# Patient Record
Sex: FEMALE | Race: WHITE | NOT HISPANIC OR LATINO
[De-identification: names, ages, dates, MRNs, and addresses within clinical notes are randomized per-mention and may not be internally consistent; named-entity substitution may affect disease eponyms.]

---

## 2019-02-21 PROBLEM — Z00.00 ENCOUNTER FOR PREVENTIVE HEALTH EXAMINATION: Status: ACTIVE | Noted: 2019-02-21

## 2019-02-28 ENCOUNTER — APPOINTMENT (OUTPATIENT)
Dept: SURGICAL ONCOLOGY | Facility: CLINIC | Age: 54
End: 2019-02-28
Payer: COMMERCIAL

## 2019-02-28 VITALS
SYSTOLIC BLOOD PRESSURE: 122 MMHG | HEIGHT: 62 IN | DIASTOLIC BLOOD PRESSURE: 85 MMHG | OXYGEN SATURATION: 97 % | BODY MASS INDEX: 35.88 KG/M2 | HEART RATE: 81 BPM | WEIGHT: 195 LBS

## 2019-02-28 DIAGNOSIS — R92.8 OTHER ABNORMAL AND INCONCLUSIVE FINDINGS ON DIAGNOSTIC IMAGING OF BREAST: ICD-10-CM

## 2019-02-28 PROCEDURE — 99245 OFF/OP CONSLTJ NEW/EST HI 55: CPT

## 2019-03-07 NOTE — PHYSICAL EXAM
[Normal] : supple, no neck mass and thyroid not enlarged [Normal Neck Lymph Nodes] : normal neck lymph nodes  [Normal Supraclavicular Lymph Nodes] : normal supraclavicular lymph nodes [Normal Groin Lymph Nodes] : normal groin lymph nodes [Normal Axillary Lymph Nodes] : normal axillary lymph nodes [Normal] : oriented to person, place and time, with appropriate affect [de-identified] : bilateral breast examination performed. the breasts are symmetrical. no contour/skin abnormalities. No nipple inversion or scaling. the breasts are heterogenously fibroglandular without any dominant nodule of suspicion

## 2019-03-07 NOTE — REASON FOR VISIT
[Initial Consultation] : an initial consultation for [Abnormal Mammogram] : abnormal mammogram [Family Member] : family member [FreeTextEntry2] : extensive family history of breast cancer

## 2019-03-07 NOTE — HISTORY OF PRESENT ILLNESS
[de-identified] : Ms. Campos is a 53 year old woman who presents with a report of an abnormal mammogram. She denies any new breast masses, pain, nipple inversion, nipple discharge, or skin/contour abnormalities of the breast. She denies prior breast surgeries or breast biopsies. She comes with a disc of her most recent mammogram/sonogram.\par \par Of note she reports breast cancer occurrence in her mother, her maternal aunt, and her younger sister, who just underwent bilateral mastectomies with reconstruction for newly diagnosed breast cancer.

## 2019-03-07 NOTE — ASSESSMENT
[FreeTextEntry1] : 53 year old woman with high risk family history of 3 1st/2nd degree family members with breast cancer. I suspect BRCA mutation or genetic mutation that would be identified on multi-gene panel. I recommended genetic testing and the patient and her 2 sisters reported that they are in the process of doing so.\par \par Unfortunately I was unable to upload or read the patient's discs that included reports of recent mammogram/sonogram that per reports are read as BIRADS3. Given the patient's high risk family history and BIRADS3 imaging (per report), I recommended she get a baseline breast MRI that will better delineate benign from suspicious tissue. I informed her that this may lead to the recommendation of a biopsy.\par \par I also informed her that the results of genetic testing may further suggest to her the possibility of proactive/prophylactic breast surgery to decrease her breast cancer risk.\par \par We will speak after her MRI to discuss the results and the next best steps going forward.

## 2019-03-15 ENCOUNTER — APPOINTMENT (OUTPATIENT)
Dept: MRI IMAGING | Facility: IMAGING CENTER | Age: 54
End: 2019-03-15

## 2019-03-15 ENCOUNTER — OUTPATIENT (OUTPATIENT)
Dept: OUTPATIENT SERVICES | Facility: HOSPITAL | Age: 54
LOS: 1 days | End: 2019-03-15
Payer: COMMERCIAL

## 2019-03-15 DIAGNOSIS — R92.8 OTHER ABNORMAL AND INCONCLUSIVE FINDINGS ON DIAGNOSTIC IMAGING OF BREAST: ICD-10-CM

## 2019-03-15 PROCEDURE — C8937: CPT

## 2019-03-15 PROCEDURE — 77049 MRI BREAST C-+ W/CAD BI: CPT | Mod: 26

## 2019-03-15 PROCEDURE — A9585: CPT

## 2019-03-15 PROCEDURE — C8908: CPT

## 2019-03-18 ENCOUNTER — RX CHANGE (OUTPATIENT)
Age: 54
End: 2019-03-18

## 2019-03-20 ENCOUNTER — APPOINTMENT (OUTPATIENT)
Dept: MRI IMAGING | Facility: CLINIC | Age: 54
End: 2019-03-20

## 2019-03-26 ENCOUNTER — OUTPATIENT (OUTPATIENT)
Dept: OUTPATIENT SERVICES | Facility: HOSPITAL | Age: 54
LOS: 1 days | End: 2019-03-26
Payer: COMMERCIAL

## 2019-03-26 ENCOUNTER — APPOINTMENT (OUTPATIENT)
Dept: MRI IMAGING | Facility: IMAGING CENTER | Age: 54
End: 2019-03-26
Payer: COMMERCIAL

## 2019-03-26 ENCOUNTER — RESULT REVIEW (OUTPATIENT)
Age: 54
End: 2019-03-26

## 2019-03-26 DIAGNOSIS — R92.8 OTHER ABNORMAL AND INCONCLUSIVE FINDINGS ON DIAGNOSTIC IMAGING OF BREAST: ICD-10-CM

## 2019-03-26 PROCEDURE — 19085 BX BREAST 1ST LESION MR IMAG: CPT | Mod: RT

## 2019-03-26 PROCEDURE — 19085 BX BREAST 1ST LESION MR IMAG: CPT

## 2019-03-26 PROCEDURE — 88305 TISSUE EXAM BY PATHOLOGIST: CPT

## 2019-03-26 PROCEDURE — A9585: CPT

## 2019-03-26 PROCEDURE — 77065 DX MAMMO INCL CAD UNI: CPT | Mod: 26,RT

## 2019-03-26 PROCEDURE — 88305 TISSUE EXAM BY PATHOLOGIST: CPT | Mod: 26

## 2019-03-26 PROCEDURE — 77065 DX MAMMO INCL CAD UNI: CPT

## 2019-04-11 ENCOUNTER — APPOINTMENT (OUTPATIENT)
Dept: MRI IMAGING | Facility: IMAGING CENTER | Age: 54
End: 2019-04-11
Payer: COMMERCIAL

## 2019-04-11 ENCOUNTER — OUTPATIENT (OUTPATIENT)
Dept: OUTPATIENT SERVICES | Facility: HOSPITAL | Age: 54
LOS: 1 days | End: 2019-04-11
Payer: COMMERCIAL

## 2019-04-11 ENCOUNTER — APPOINTMENT (OUTPATIENT)
Age: 54
End: 2019-04-11
Payer: COMMERCIAL

## 2019-04-11 VITALS
SYSTOLIC BLOOD PRESSURE: 112 MMHG | WEIGHT: 175 LBS | BODY MASS INDEX: 32.2 KG/M2 | DIASTOLIC BLOOD PRESSURE: 75 MMHG | HEIGHT: 62 IN | RESPIRATION RATE: 15 BRPM | HEART RATE: 75 BPM | OXYGEN SATURATION: 98 %

## 2019-04-11 DIAGNOSIS — Z00.00 ENCOUNTER FOR GENERAL ADULT MEDICAL EXAMINATION WITHOUT ABNORMAL FINDINGS: ICD-10-CM

## 2019-04-11 DIAGNOSIS — Z80.49 FAMILY HISTORY OF MALIGNANT NEOPLASM OF OTHER GENITAL ORGANS: ICD-10-CM

## 2019-04-11 DIAGNOSIS — Z78.9 OTHER SPECIFIED HEALTH STATUS: ICD-10-CM

## 2019-04-11 DIAGNOSIS — Z80.3 FAMILY HISTORY OF MALIGNANT NEOPLASM OF BREAST: ICD-10-CM

## 2019-04-11 DIAGNOSIS — Z80.41 FAMILY HISTORY OF MALIGNANT NEOPLASM OF OVARY: ICD-10-CM

## 2019-04-11 DIAGNOSIS — Z80.0 FAMILY HISTORY OF MALIGNANT NEOPLASM OF DIGESTIVE ORGANS: ICD-10-CM

## 2019-04-11 PROCEDURE — 99205 OFFICE O/P NEW HI 60 MIN: CPT

## 2019-04-11 PROCEDURE — A9585: CPT

## 2019-04-11 PROCEDURE — 74183 MRI ABD W/O CNTR FLWD CNTR: CPT | Mod: 26

## 2019-04-11 PROCEDURE — 74183 MRI ABD W/O CNTR FLWD CNTR: CPT

## 2019-04-11 NOTE — HISTORY OF PRESENT ILLNESS
[FreeTextEntry1] : 54 yo female presents for a breast reconstruction consultation.  The patient states she had an abnormal mammogram/sonogram in January 2019.  She then underwent MRI and biopsy.  The biopsy result was negative, but the patient states radiologist said biopsy was not a representative sample and the patient still had a suspicious mass that requires surgery.  \par \par The patient has a strong family history of breast cancer in her mother, sister, maternal aunts, and maternal great aunts.  She also has family history of uterine and ovarian cancer in her maternal aunt.  She states her genetic testing was negative.  The patient is overall healthy and takes no medications.  \par \par She has surgical history of abdominoplasty, three c-sections (ages 20, [age] yo), and a laparoscopic cholecystectomy.  She does not smoke, she does not drink alcohol, she is , and works as an .  She is considering bilateral mastectomy and would like to discuss reconstruction options.

## 2019-04-11 NOTE — REASON FOR VISIT
[Consultation] : a consultation visit [FreeTextEntry1] : patient presents for breast reconstruction consultation.

## 2019-04-11 NOTE — PHYSICAL EXAM
[NI] : Normal [de-identified] : sternal notch to nipple on the left is 29 cm and 29.5 cm on the right, the nipple to IMF is 13 cm on the left and 12 cm on the right, the base width is 13 cm bilaterally, there is grade 2 ptosis bilaterally. There is significant breast asymmetry.  The patient has wide breast. The left breast is lower and larger than the right breast. [de-identified] : multiple scars, transverse abdominoplasty scar; supraumbilical visceral fat present [de-identified] : limited adiposity for flap tissue transfer

## 2019-04-15 ENCOUNTER — RX RENEWAL (OUTPATIENT)
Age: 54
End: 2019-04-15

## 2019-04-22 ENCOUNTER — OUTPATIENT (OUTPATIENT)
Dept: OUTPATIENT SERVICES | Facility: HOSPITAL | Age: 54
LOS: 1 days | End: 2019-04-22

## 2019-04-22 VITALS
SYSTOLIC BLOOD PRESSURE: 120 MMHG | DIASTOLIC BLOOD PRESSURE: 82 MMHG | HEART RATE: 73 BPM | WEIGHT: 192.9 LBS | RESPIRATION RATE: 16 BRPM | OXYGEN SATURATION: 99 % | TEMPERATURE: 97 F | HEIGHT: 62.5 IN

## 2019-04-22 DIAGNOSIS — Z98.891 HISTORY OF UTERINE SCAR FROM PREVIOUS SURGERY: Chronic | ICD-10-CM

## 2019-04-22 DIAGNOSIS — Z42.1 ENCOUNTER FOR BREAST RECONSTRUCTION FOLLOWING MASTECTOMY: ICD-10-CM

## 2019-04-22 DIAGNOSIS — Z90.49 ACQUIRED ABSENCE OF OTHER SPECIFIED PARTS OF DIGESTIVE TRACT: Chronic | ICD-10-CM

## 2019-04-22 DIAGNOSIS — N63.0 UNSPECIFIED LUMP IN UNSPECIFIED BREAST: ICD-10-CM

## 2019-04-22 DIAGNOSIS — Z98.890 OTHER SPECIFIED POSTPROCEDURAL STATES: Chronic | ICD-10-CM

## 2019-04-22 LAB
ANION GAP SERPL CALC-SCNC: 13 MMO/L — SIGNIFICANT CHANGE UP (ref 7–14)
BUN SERPL-MCNC: 15 MG/DL — SIGNIFICANT CHANGE UP (ref 7–23)
CALCIUM SERPL-MCNC: 9.7 MG/DL — SIGNIFICANT CHANGE UP (ref 8.4–10.5)
CHLORIDE SERPL-SCNC: 102 MMOL/L — SIGNIFICANT CHANGE UP (ref 98–107)
CO2 SERPL-SCNC: 27 MMOL/L — SIGNIFICANT CHANGE UP (ref 22–31)
CREAT SERPL-MCNC: 0.58 MG/DL — SIGNIFICANT CHANGE UP (ref 0.5–1.3)
GLUCOSE SERPL-MCNC: 84 MG/DL — SIGNIFICANT CHANGE UP (ref 70–99)
HCT VFR BLD CALC: 42.8 % — SIGNIFICANT CHANGE UP (ref 34.5–45)
HGB BLD-MCNC: 13.6 G/DL — SIGNIFICANT CHANGE UP (ref 11.5–15.5)
MCHC RBC-ENTMCNC: 28.9 PG — SIGNIFICANT CHANGE UP (ref 27–34)
MCHC RBC-ENTMCNC: 31.8 % — LOW (ref 32–36)
MCV RBC AUTO: 90.9 FL — SIGNIFICANT CHANGE UP (ref 80–100)
NRBC # FLD: 0 K/UL — SIGNIFICANT CHANGE UP (ref 0–0)
PLATELET # BLD AUTO: 297 K/UL — SIGNIFICANT CHANGE UP (ref 150–400)
PMV BLD: 10.3 FL — SIGNIFICANT CHANGE UP (ref 7–13)
POTASSIUM SERPL-MCNC: 4.3 MMOL/L — SIGNIFICANT CHANGE UP (ref 3.5–5.3)
POTASSIUM SERPL-SCNC: 4.3 MMOL/L — SIGNIFICANT CHANGE UP (ref 3.5–5.3)
RBC # BLD: 4.71 M/UL — SIGNIFICANT CHANGE UP (ref 3.8–5.2)
RBC # FLD: 13.6 % — SIGNIFICANT CHANGE UP (ref 10.3–14.5)
SODIUM SERPL-SCNC: 142 MMOL/L — SIGNIFICANT CHANGE UP (ref 135–145)
WBC # BLD: 7.09 K/UL — SIGNIFICANT CHANGE UP (ref 3.8–10.5)
WBC # FLD AUTO: 7.09 K/UL — SIGNIFICANT CHANGE UP (ref 3.8–10.5)

## 2019-04-22 NOTE — H&P PST ADULT - ASSESSMENT
53 y.o female with preop diagnosis of encounter for breast reconstruction following mastectomy, family history of malignant neoplasm of breast

## 2019-04-22 NOTE — H&P PST ADULT - NEGATIVE GENERAL GENITOURINARY SYMPTOMS
no hematuria/no renal colic/no incontinence/no flank pain L/no dysuria/no flank pain R/no bladder infections

## 2019-04-22 NOTE — H&P PST ADULT - NSICDXPROBLEM_GEN_ALL_CORE_FT
PROBLEM DIAGNOSES  Problem: Breast lump  Assessment and Plan: pt scheduled for [facility] - Right Breast Lumpectomy Post Bernarda  Reftector Placement, Right Axillary [place] Lymph Node Biopsy Possible Dissection, Right Oncoplastic breast reconstruction, left breast reduction for symmetry on 05/03/19  Preop instructions provided. Pt verbalized understanding.   pt to take omeprazole on the morning of the surgery for GI prophylaxis  Chlorhexidine wash with instructions provided.

## 2019-04-22 NOTE — H&P PST ADULT - HISTORY OF PRESENT ILLNESS
53 y.o. female with hx of acid reflux, reports hx of abnormal mammogram in 02/2019, followed by MRI, biopsy, reports benign, pt denies changes in breast, nipple discharge, fatigue, reports breast tenderness, presents to PST for evaluation for [facility] - Right Breast Lumpectomy Post Bernarda  Reftector Placement, Right Axillary [place] Lymph Node Biopsy Possible Dissection, Ight Oncoplastic breast reconstruction, left breast reduction for symmetry on 05/03/19 53 y.o. female with hx of acid reflux, reports right breast mass noted on mammogram in 02/2019, followed by MRI, biopsy, reports benign, pt denies changes in breast, nipple discharge, fatigue, reports breast tenderness, presents to PST for evaluation for [facility] - Right Breast Lumpectomy Post Bernarda  Reftector Placement, Right Axillary [place] Lymph Node Biopsy Possible Dissection, Ight Oncoplastic breast reconstruction, left breast reduction for symmetry on 05/03/19

## 2019-04-22 NOTE — H&P PST ADULT - NEGATIVE ENMT SYMPTOMS
no sinus symptoms/no post-nasal discharge/no throat pain/no dysphagia/no nasal congestion/no hearing difficulty

## 2019-04-22 NOTE — H&P PST ADULT - NSICDXFAMILYHX_GEN_ALL_CORE_FT
FAMILY HISTORY:  Family history of breast cancer, sister, mother  Family history of rectal cancer, mother  FH: diabetes mellitus, father  FH: heart disease, father  FH: HTN (hypertension), mother, father  FH: lymphoma, sister

## 2019-04-23 ENCOUNTER — APPOINTMENT (OUTPATIENT)
Dept: MAMMOGRAPHY | Facility: IMAGING CENTER | Age: 54
End: 2019-04-23
Payer: COMMERCIAL

## 2019-04-23 ENCOUNTER — OUTPATIENT (OUTPATIENT)
Dept: OUTPATIENT SERVICES | Facility: HOSPITAL | Age: 54
LOS: 1 days | End: 2019-04-23
Payer: COMMERCIAL

## 2019-04-23 DIAGNOSIS — Z90.49 ACQUIRED ABSENCE OF OTHER SPECIFIED PARTS OF DIGESTIVE TRACT: Chronic | ICD-10-CM

## 2019-04-23 DIAGNOSIS — R92.8 OTHER ABNORMAL AND INCONCLUSIVE FINDINGS ON DIAGNOSTIC IMAGING OF BREAST: ICD-10-CM

## 2019-04-23 DIAGNOSIS — Z98.890 OTHER SPECIFIED POSTPROCEDURAL STATES: Chronic | ICD-10-CM

## 2019-04-23 DIAGNOSIS — Z98.891 HISTORY OF UTERINE SCAR FROM PREVIOUS SURGERY: Chronic | ICD-10-CM

## 2019-04-23 PROCEDURE — 19281 PERQ DEVICE BREAST 1ST IMAG: CPT | Mod: RT

## 2019-04-23 PROCEDURE — C1739: CPT

## 2019-04-23 PROCEDURE — 19281 PERQ DEVICE BREAST 1ST IMAG: CPT

## 2019-04-30 ENCOUNTER — APPOINTMENT (OUTPATIENT)
Dept: SURGICAL ONCOLOGY | Facility: CLINIC | Age: 54
End: 2019-04-30

## 2019-04-30 PROBLEM — N63.0 UNSPECIFIED LUMP IN UNSPECIFIED BREAST: Chronic | Status: ACTIVE | Noted: 2019-04-22

## 2019-04-30 PROBLEM — K21.9 GASTRO-ESOPHAGEAL REFLUX DISEASE WITHOUT ESOPHAGITIS: Chronic | Status: ACTIVE | Noted: 2019-04-22

## 2019-05-02 ENCOUNTER — TRANSCRIPTION ENCOUNTER (OUTPATIENT)
Age: 54
End: 2019-05-02

## 2019-05-03 ENCOUNTER — APPOINTMENT (OUTPATIENT)
Dept: SURGICAL ONCOLOGY | Facility: AMBULATORY SURGERY CENTER | Age: 54
End: 2019-05-03

## 2019-05-03 ENCOUNTER — OUTPATIENT (OUTPATIENT)
Dept: OUTPATIENT SERVICES | Facility: HOSPITAL | Age: 54
LOS: 1 days | End: 2019-05-03
Payer: COMMERCIAL

## 2019-05-03 ENCOUNTER — APPOINTMENT (OUTPATIENT)
Dept: NUCLEAR MEDICINE | Facility: IMAGING CENTER | Age: 54
End: 2019-05-03

## 2019-05-03 ENCOUNTER — RESULT REVIEW (OUTPATIENT)
Age: 54
End: 2019-05-03

## 2019-05-03 ENCOUNTER — APPOINTMENT (OUTPATIENT)
Dept: MAMMOGRAPHY | Facility: IMAGING CENTER | Age: 54
End: 2019-05-03

## 2019-05-03 ENCOUNTER — OUTPATIENT (OUTPATIENT)
Dept: OUTPATIENT SERVICES | Facility: HOSPITAL | Age: 54
LOS: 1 days | Discharge: HOME | End: 2019-05-03
Payer: COMMERCIAL

## 2019-05-03 VITALS
TEMPERATURE: 98 F | OXYGEN SATURATION: 99 % | WEIGHT: 192.9 LBS | HEIGHT: 62 IN | DIASTOLIC BLOOD PRESSURE: 71 MMHG | SYSTOLIC BLOOD PRESSURE: 104 MMHG | HEART RATE: 72 BPM | RESPIRATION RATE: 20 BRPM

## 2019-05-03 VITALS
HEART RATE: 91 BPM | DIASTOLIC BLOOD PRESSURE: 75 MMHG | RESPIRATION RATE: 16 BRPM | OXYGEN SATURATION: 99 % | SYSTOLIC BLOOD PRESSURE: 122 MMHG

## 2019-05-03 DIAGNOSIS — Z90.49 ACQUIRED ABSENCE OF OTHER SPECIFIED PARTS OF DIGESTIVE TRACT: Chronic | ICD-10-CM

## 2019-05-03 DIAGNOSIS — Z98.890 OTHER SPECIFIED POSTPROCEDURAL STATES: Chronic | ICD-10-CM

## 2019-05-03 DIAGNOSIS — Z42.1 ENCOUNTER FOR BREAST RECONSTRUCTION FOLLOWING MASTECTOMY: ICD-10-CM

## 2019-05-03 DIAGNOSIS — Z00.8 ENCOUNTER FOR OTHER GENERAL EXAMINATION: ICD-10-CM

## 2019-05-03 DIAGNOSIS — Z98.891 HISTORY OF UTERINE SCAR FROM PREVIOUS SURGERY: Chronic | ICD-10-CM

## 2019-05-03 PROCEDURE — 19366: CPT | Mod: RT

## 2019-05-03 PROCEDURE — 76098 X-RAY EXAM SURGICAL SPECIMEN: CPT | Mod: 26

## 2019-05-03 PROCEDURE — 19318 BREAST REDUCTION: CPT | Mod: LT

## 2019-05-03 PROCEDURE — 38792 RA TRACER ID OF SENTINL NODE: CPT | Mod: 59

## 2019-05-03 PROCEDURE — A9541: CPT

## 2019-05-03 PROCEDURE — 19125 EXCISION BREAST LESION: CPT

## 2019-05-03 PROCEDURE — 76098 X-RAY EXAM SURGICAL SPECIMEN: CPT

## 2019-05-03 PROCEDURE — 88305 TISSUE EXAM BY PATHOLOGIST: CPT | Mod: 26

## 2019-05-03 PROCEDURE — 38525 BIOPSY/REMOVAL LYMPH NODES: CPT

## 2019-05-03 PROCEDURE — 88307 TISSUE EXAM BY PATHOLOGIST: CPT | Mod: 26

## 2019-05-03 RX ORDER — SODIUM CHLORIDE 9 MG/ML
1000 INJECTION, SOLUTION INTRAVENOUS
Qty: 0 | Refills: 0 | Status: DISCONTINUED | OUTPATIENT
Start: 2019-05-03 | End: 2019-05-18

## 2019-05-03 NOTE — BRIEF OPERATIVE NOTE - NSICDXBRIEFPROCEDURE_GEN_ALL_CORE_FT
PROCEDURES:  Lumpectomy, breast, with sentinel lymph node biopsy 03-May-2019 09:16:49  Mili Mendoza  Right breast lumpectomy 03-May-2019 09:16:14  Mili Mendoza
PROCEDURES:  Reduction, breast, left 03-May-2019 11:24:11  Jg Nelson  Reconstruction, breast, using oncoplastic technique 03-May-2019 11:24:02  Jg Nelson

## 2019-05-03 NOTE — BRIEF OPERATIVE NOTE - NSICDXBRIEFPOSTOP_GEN_ALL_CORE_FT
POST-OP DIAGNOSIS:  Breast mass, right 03-May-2019 09:17:15  Mili Mendoza
POST-OP DIAGNOSIS:  Breast mass, right 03-May-2019 09:17:15  Mili Mendoza

## 2019-05-03 NOTE — BRIEF OPERATIVE NOTE - OPERATION/FINDINGS
right breast lumpectomy post KAYE , right axillary sentinel lymph node identified with isosulfan blue and technetium  Plastics to perform reconstruction/closure
left symmetrizing reduction and right oncoplastic reduction, reconstruction. 19fr neena drain in each breast. reconstruction of axillary surgical site.

## 2019-05-03 NOTE — ASU DISCHARGE PLAN (ADULT/PEDIATRIC) - ASU DC SPECIAL INSTRUCTIONSFT
Resume normal diet. Avoid straining, exercise, or heavy lifting.    Take medications as instructed by prescriptions.    Monitor and clean drains as instructed.    Sponge bathe only. Keep surgical bra and dressings dry and in place until follow up with Dr. Valentin.    Do not raise arms above head.    Follow-up with primary care doctor as well.     Call 911 and return to the ED for chest pain, shortness of breath, significant increase in pain, or significant change in color of surgical sites.

## 2019-05-03 NOTE — ASU DISCHARGE PLAN (ADULT/PEDIATRIC) - PROCEDURE
Right breast lumpectomy post KAYE , right axillary sentinel lymph node biopsy, plastics reconstruction, left breast reduction

## 2019-05-03 NOTE — ASU DISCHARGE PLAN (ADULT/PEDIATRIC) - CALL YOUR DOCTOR IF YOU HAVE ANY OF THE FOLLOWING:
Pain not relieved by Medications/Swelling that gets worse/Fever greater than (need to indicate Fahrenheit or Celsius)/Wound/Surgical Site with redness, or foul smelling discharge or pus/Bleeding that does not stop/Nausea and vomiting that does not stop/Unable to urinate/Inability to tolerate liquids or foods

## 2019-05-03 NOTE — BRIEF OPERATIVE NOTE - NSICDXBRIEFPREOP_GEN_ALL_CORE_FT
PRE-OP DIAGNOSIS:  Breast mass, right 03-May-2019 09:17:02  Mili Mendoza
PRE-OP DIAGNOSIS:  Breast mass, right 03-May-2019 09:17:02  Mili Mendoza

## 2019-05-03 NOTE — ASU DISCHARGE PLAN (ADULT/PEDIATRIC) - CARE PROVIDER_API CALL
Fidel Adames (MD)  Surgery  [address] [place] [address]  Phone: [phone]  Fax: [phone]  Follow Up Time: 2 weeks Fidel Adames)  Surgery  [address] [place] [address]  Phone: [phone]  Fax: [phone]  Follow Up Time: 2 weeks    Jg Valentin; ROBBIE)  Otolaryngology; Plastic Surgery  [address] [place] [address] [address]  [place] [address]  Phone: [phone]  Fax: [phone]  Follow Up Time: 1 week

## 2019-05-03 NOTE — ASU DISCHARGE PLAN (ADULT/PEDIATRIC) - PROVIDER TOKENS
PROVIDER:[TOKEN:[[id number]],FOLLOWUP:[2 weeks]] PROVIDER:[TOKEN:[[id number]],FOLLOWUP:[2 weeks]],PROVIDER:[TOKEN:[[id number]],FOLLOWUP:[1 week]]

## 2019-05-06 ENCOUNTER — APPOINTMENT (OUTPATIENT)
Dept: PLASTIC SURGERY | Facility: CLINIC | Age: 54
End: 2019-05-06
Payer: COMMERCIAL

## 2019-05-06 PROCEDURE — 99024 POSTOP FOLLOW-UP VISIT: CPT

## 2019-05-08 ENCOUNTER — APPOINTMENT (OUTPATIENT)
Dept: PLASTIC SURGERY | Facility: CLINIC | Age: 54
End: 2019-05-08
Payer: COMMERCIAL

## 2019-05-08 PROCEDURE — 99024 POSTOP FOLLOW-UP VISIT: CPT

## 2019-05-09 NOTE — REASON FOR VISIT
[Post Op: _________] : a [unfilled] post op visit [Family Member] : family member [FreeTextEntry1] : DOS: 05/03/2019 right onconplastic breast reconstruction left breast reduction for symmetry. Patient c/o soreness/ discomfort.

## 2019-05-13 ENCOUNTER — INPATIENT (INPATIENT)
Facility: HOSPITAL | Age: 54
LOS: 1 days | Discharge: HOME | End: 2019-05-15
Attending: PLASTIC SURGERY | Admitting: PLASTIC SURGERY
Payer: COMMERCIAL

## 2019-05-13 ENCOUNTER — RESULT REVIEW (OUTPATIENT)
Age: 54
End: 2019-05-13

## 2019-05-13 ENCOUNTER — APPOINTMENT (OUTPATIENT)
Dept: PLASTIC SURGERY | Facility: CLINIC | Age: 54
End: 2019-05-13

## 2019-05-13 VITALS
RESPIRATION RATE: 21 BRPM | OXYGEN SATURATION: 96 % | HEART RATE: 108 BPM | WEIGHT: 195.99 LBS | DIASTOLIC BLOOD PRESSURE: 65 MMHG | TEMPERATURE: 99 F | SYSTOLIC BLOOD PRESSURE: 109 MMHG | HEIGHT: 62 IN

## 2019-05-13 DIAGNOSIS — R50.9 FEVER, UNSPECIFIED: ICD-10-CM

## 2019-05-13 DIAGNOSIS — E87.0 HYPEROSMOLALITY AND HYPERNATREMIA: ICD-10-CM

## 2019-05-13 DIAGNOSIS — L03.90 CELLULITIS, UNSPECIFIED: ICD-10-CM

## 2019-05-13 DIAGNOSIS — Z90.49 ACQUIRED ABSENCE OF OTHER SPECIFIED PARTS OF DIGESTIVE TRACT: Chronic | ICD-10-CM

## 2019-05-13 DIAGNOSIS — R21 RASH AND OTHER NONSPECIFIC SKIN ERUPTION: ICD-10-CM

## 2019-05-13 DIAGNOSIS — Z98.890 OTHER SPECIFIED POSTPROCEDURAL STATES: Chronic | ICD-10-CM

## 2019-05-13 DIAGNOSIS — Z98.891 HISTORY OF UTERINE SCAR FROM PREVIOUS SURGERY: Chronic | ICD-10-CM

## 2019-05-13 LAB
ALBUMIN SERPL ELPH-MCNC: 3.1 G/DL — LOW (ref 3.3–5)
ALBUMIN SERPL ELPH-MCNC: 3.1 G/DL — LOW (ref 3.3–5)
ALP SERPL-CCNC: 51 U/L — SIGNIFICANT CHANGE UP (ref 40–120)
ALP SERPL-CCNC: 51 U/L — SIGNIFICANT CHANGE UP (ref 40–120)
ALT FLD-CCNC: 17 U/L — SIGNIFICANT CHANGE UP (ref 4–33)
ALT FLD-CCNC: 17 U/L — SIGNIFICANT CHANGE UP (ref 4–33)
ANION GAP SERPL CALC-SCNC: 18 MMO/L — HIGH (ref 7–14)
ANION GAP SERPL CALC-SCNC: 18 MMO/L — HIGH (ref 7–14)
APPEARANCE UR: CLEAR — SIGNIFICANT CHANGE UP
AST SERPL-CCNC: 17 U/L — SIGNIFICANT CHANGE UP (ref 4–32)
AST SERPL-CCNC: 17 U/L — SIGNIFICANT CHANGE UP (ref 4–32)
BASOPHILS # BLD AUTO: 0.03 K/UL — SIGNIFICANT CHANGE UP (ref 0–0.2)
BASOPHILS NFR BLD AUTO: 0.2 % — SIGNIFICANT CHANGE UP (ref 0–2)
BASOPHILS NFR SPEC: 0 % — SIGNIFICANT CHANGE UP (ref 0–2)
BASOPHILS NFR SPEC: 0 % — SIGNIFICANT CHANGE UP (ref 0–2)
BILIRUB DIRECT SERPL-MCNC: < 0.2 MG/DL — SIGNIFICANT CHANGE UP (ref 0.1–0.2)
BILIRUB SERPL-MCNC: 0.6 MG/DL — SIGNIFICANT CHANGE UP (ref 0.2–1.2)
BILIRUB SERPL-MCNC: 0.6 MG/DL — SIGNIFICANT CHANGE UP (ref 0.2–1.2)
BILIRUB UR-MCNC: NEGATIVE — SIGNIFICANT CHANGE UP
BLOOD UR QL VISUAL: NEGATIVE — SIGNIFICANT CHANGE UP
BUN SERPL-MCNC: 7 MG/DL — SIGNIFICANT CHANGE UP (ref 7–23)
BUN SERPL-MCNC: 7 MG/DL — SIGNIFICANT CHANGE UP (ref 7–23)
CALCIUM SERPL-MCNC: 9 MG/DL — SIGNIFICANT CHANGE UP (ref 8.4–10.5)
CALCIUM SERPL-MCNC: 9 MG/DL — SIGNIFICANT CHANGE UP (ref 8.4–10.5)
CHLORIDE SERPL-SCNC: 112 MMOL/L — HIGH (ref 98–107)
CHLORIDE SERPL-SCNC: 112 MMOL/L — HIGH (ref 98–107)
CO2 SERPL-SCNC: 20 MMOL/L — LOW (ref 22–31)
CO2 SERPL-SCNC: 20 MMOL/L — LOW (ref 22–31)
COLOR SPEC: SIGNIFICANT CHANGE UP
CREAT SERPL-MCNC: 0.6 MG/DL — SIGNIFICANT CHANGE UP (ref 0.5–1.3)
CREAT SERPL-MCNC: 0.6 MG/DL — SIGNIFICANT CHANGE UP (ref 0.5–1.3)
EOSINOPHIL # BLD AUTO: 0.1 K/UL — SIGNIFICANT CHANGE UP (ref 0–0.5)
EOSINOPHIL NFR BLD AUTO: 0.7 % — SIGNIFICANT CHANGE UP (ref 0–6)
EOSINOPHIL NFR FLD: 1 % — SIGNIFICANT CHANGE UP (ref 0–6)
EOSINOPHIL NFR FLD: 1 % — SIGNIFICANT CHANGE UP (ref 0–6)
GLUCOSE SERPL-MCNC: 99 MG/DL — SIGNIFICANT CHANGE UP (ref 70–99)
GLUCOSE SERPL-MCNC: 99 MG/DL — SIGNIFICANT CHANGE UP (ref 70–99)
GLUCOSE UR-MCNC: NEGATIVE — SIGNIFICANT CHANGE UP
HCT VFR BLD CALC: 38.8 % — SIGNIFICANT CHANGE UP (ref 34.5–45)
HCT VFR BLD CALC: 38.8 % — SIGNIFICANT CHANGE UP (ref 34.5–45)
HGB BLD-MCNC: 12.2 G/DL — SIGNIFICANT CHANGE UP (ref 11.5–15.5)
HGB BLD-MCNC: 12.2 G/DL — SIGNIFICANT CHANGE UP (ref 11.5–15.5)
IMM GRANULOCYTES NFR BLD AUTO: 0.9 % — SIGNIFICANT CHANGE UP (ref 0–1.5)
KETONES UR-MCNC: NEGATIVE — SIGNIFICANT CHANGE UP
LEUKOCYTE ESTERASE UR-ACNC: NEGATIVE — SIGNIFICANT CHANGE UP
LYMPHOCYTES # BLD AUTO: 14.5 % — SIGNIFICANT CHANGE UP (ref 13–44)
LYMPHOCYTES # BLD AUTO: 2.13 K/UL — SIGNIFICANT CHANGE UP (ref 1–3.3)
LYMPHOCYTES NFR SPEC AUTO: 20 % — SIGNIFICANT CHANGE UP (ref 13–44)
LYMPHOCYTES NFR SPEC AUTO: 20 % — SIGNIFICANT CHANGE UP (ref 13–44)
MACROCYTES BLD QL: SLIGHT — SIGNIFICANT CHANGE UP
MACROCYTES BLD QL: SLIGHT — SIGNIFICANT CHANGE UP
MAGNESIUM SERPL-MCNC: 1.8 MG/DL — SIGNIFICANT CHANGE UP (ref 1.6–2.6)
MANUAL SMEAR VERIFICATION: SIGNIFICANT CHANGE UP
MANUAL SMEAR VERIFICATION: SIGNIFICANT CHANGE UP
MCHC RBC-ENTMCNC: 28.4 PG — SIGNIFICANT CHANGE UP (ref 27–34)
MCHC RBC-ENTMCNC: 28.4 PG — SIGNIFICANT CHANGE UP (ref 27–34)
MCHC RBC-ENTMCNC: 31.4 % — LOW (ref 32–36)
MCHC RBC-ENTMCNC: 31.4 % — LOW (ref 32–36)
MCV RBC AUTO: 90.2 FL — SIGNIFICANT CHANGE UP (ref 80–100)
MCV RBC AUTO: 90.2 FL — SIGNIFICANT CHANGE UP (ref 80–100)
METAMYELOCYTES # FLD: 1 % — SIGNIFICANT CHANGE UP (ref 0–1)
METAMYELOCYTES # FLD: 1 % — SIGNIFICANT CHANGE UP (ref 0–1)
MONOCYTES # BLD AUTO: 0.19 K/UL — SIGNIFICANT CHANGE UP (ref 0–0.9)
MONOCYTES NFR BLD AUTO: 1.3 % — LOW (ref 2–14)
MONOCYTES NFR BLD: 1 % — LOW (ref 2–9)
MONOCYTES NFR BLD: 1 % — LOW (ref 2–9)
NEUTROPHIL AB SER-ACNC: 77 % — SIGNIFICANT CHANGE UP (ref 43–77)
NEUTROPHIL AB SER-ACNC: 77 % — SIGNIFICANT CHANGE UP (ref 43–77)
NEUTROPHILS # BLD AUTO: 12.13 K/UL — HIGH (ref 1.8–7.4)
NEUTROPHILS NFR BLD AUTO: 82.4 % — HIGH (ref 43–77)
NITRITE UR-MCNC: NEGATIVE — SIGNIFICANT CHANGE UP
NRBC # BLD: 0 /100WBC — SIGNIFICANT CHANGE UP
NRBC # BLD: 0 /100WBC — SIGNIFICANT CHANGE UP
NRBC # FLD: 0 K/UL — SIGNIFICANT CHANGE UP (ref 0–0)
NRBC # FLD: 0 K/UL — SIGNIFICANT CHANGE UP (ref 0–0)
PH UR: 7.5 — SIGNIFICANT CHANGE UP (ref 5–8)
PHOSPHATE SERPL-MCNC: 3 MG/DL — SIGNIFICANT CHANGE UP (ref 2.5–4.5)
PLATELET # BLD AUTO: 352 K/UL — SIGNIFICANT CHANGE UP (ref 150–400)
PLATELET # BLD AUTO: 352 K/UL — SIGNIFICANT CHANGE UP (ref 150–400)
PLATELET COUNT - ESTIMATE: NORMAL — SIGNIFICANT CHANGE UP
PLATELET COUNT - ESTIMATE: NORMAL — SIGNIFICANT CHANGE UP
PMV BLD: 9 FL — SIGNIFICANT CHANGE UP (ref 7–13)
PMV BLD: 9 FL — SIGNIFICANT CHANGE UP (ref 7–13)
POTASSIUM SERPL-MCNC: 4.3 MMOL/L — SIGNIFICANT CHANGE UP (ref 3.5–5.3)
POTASSIUM SERPL-MCNC: 4.3 MMOL/L — SIGNIFICANT CHANGE UP (ref 3.5–5.3)
POTASSIUM SERPL-SCNC: 4.3 MMOL/L — SIGNIFICANT CHANGE UP (ref 3.5–5.3)
POTASSIUM SERPL-SCNC: 4.3 MMOL/L — SIGNIFICANT CHANGE UP (ref 3.5–5.3)
PROT SERPL-MCNC: 6.3 G/DL — SIGNIFICANT CHANGE UP (ref 6–8.3)
PROT SERPL-MCNC: 6.3 G/DL — SIGNIFICANT CHANGE UP (ref 6–8.3)
PROT UR-MCNC: 10 — SIGNIFICANT CHANGE UP
RBC # BLD: 4.3 M/UL — SIGNIFICANT CHANGE UP (ref 3.8–5.2)
RBC # BLD: 4.3 M/UL — SIGNIFICANT CHANGE UP (ref 3.8–5.2)
RBC # FLD: 14.3 % — SIGNIFICANT CHANGE UP (ref 10.3–14.5)
RBC # FLD: 14.3 % — SIGNIFICANT CHANGE UP (ref 10.3–14.5)
SODIUM SERPL-SCNC: 150 MMOL/L — HIGH (ref 135–145)
SODIUM SERPL-SCNC: 150 MMOL/L — HIGH (ref 135–145)
SP GR SPEC: 1.01 — SIGNIFICANT CHANGE UP (ref 1–1.04)
UROBILINOGEN FLD QL: NORMAL — SIGNIFICANT CHANGE UP
WBC # BLD: 15.35 K/UL — HIGH (ref 3.8–10.5)
WBC # BLD: 15.35 K/UL — HIGH (ref 3.8–10.5)
WBC # FLD AUTO: 15.35 K/UL — HIGH (ref 3.8–10.5)
WBC # FLD AUTO: 15.35 K/UL — HIGH (ref 3.8–10.5)

## 2019-05-13 PROCEDURE — 88313 SPECIAL STAINS GROUP 2: CPT | Mod: 26

## 2019-05-13 PROCEDURE — 88312 SPECIAL STAINS GROUP 1: CPT | Mod: 26

## 2019-05-13 PROCEDURE — 99254 IP/OBS CNSLTJ NEW/EST MOD 60: CPT | Mod: GC

## 2019-05-13 PROCEDURE — 88305 TISSUE EXAM BY PATHOLOGIST: CPT | Mod: 26

## 2019-05-13 PROCEDURE — 99253 IP/OBS CNSLTJ NEW/EST LOW 45: CPT | Mod: 25

## 2019-05-13 PROCEDURE — 11104 PUNCH BX SKIN SINGLE LESION: CPT

## 2019-05-13 PROCEDURE — 99223 1ST HOSP IP/OBS HIGH 75: CPT | Mod: GC

## 2019-05-13 RX ORDER — PANTOPRAZOLE SODIUM 20 MG/1
40 TABLET, DELAYED RELEASE ORAL
Refills: 0 | Status: DISCONTINUED | OUTPATIENT
Start: 2019-05-13 | End: 2019-05-13

## 2019-05-13 RX ORDER — SODIUM CHLORIDE 9 MG/ML
1000 INJECTION INTRAMUSCULAR; INTRAVENOUS; SUBCUTANEOUS
Refills: 0 | Status: DISCONTINUED | OUTPATIENT
Start: 2019-05-13 | End: 2019-05-13

## 2019-05-13 RX ORDER — SODIUM CHLORIDE 9 MG/ML
1000 INJECTION, SOLUTION INTRAVENOUS
Refills: 0 | Status: DISCONTINUED | OUTPATIENT
Start: 2019-05-13 | End: 2019-05-15

## 2019-05-13 RX ORDER — ACETAMINOPHEN 500 MG
650 TABLET ORAL EVERY 6 HOURS
Refills: 0 | Status: DISCONTINUED | OUTPATIENT
Start: 2019-05-13 | End: 2019-05-15

## 2019-05-13 RX ORDER — SODIUM CHLORIDE 9 MG/ML
1000 INJECTION, SOLUTION INTRAVENOUS
Refills: 0 | Status: DISCONTINUED | OUTPATIENT
Start: 2019-05-13 | End: 2019-05-13

## 2019-05-13 RX ORDER — ONDANSETRON 8 MG/1
4 TABLET, FILM COATED ORAL EVERY 6 HOURS
Refills: 0 | Status: DISCONTINUED | OUTPATIENT
Start: 2019-05-13 | End: 2019-05-15

## 2019-05-13 RX ORDER — ENOXAPARIN SODIUM 100 MG/ML
40 INJECTION SUBCUTANEOUS DAILY
Refills: 0 | Status: DISCONTINUED | OUTPATIENT
Start: 2019-05-13 | End: 2019-05-15

## 2019-05-13 RX ORDER — OXYCODONE AND ACETAMINOPHEN 5; 325 MG/1; MG/1
1 TABLET ORAL EVERY 4 HOURS
Refills: 0 | Status: DISCONTINUED | OUTPATIENT
Start: 2019-05-13 | End: 2019-05-15

## 2019-05-13 RX ORDER — CALCIUM CARBONATE 500(1250)
3 TABLET ORAL EVERY 6 HOURS
Refills: 0 | Status: DISCONTINUED | OUTPATIENT
Start: 2019-05-13 | End: 2019-05-15

## 2019-05-13 RX ORDER — HYDROCORTISONE 1 %
1 OINTMENT (GRAM) TOPICAL
Refills: 0 | Status: DISCONTINUED | OUTPATIENT
Start: 2019-05-13 | End: 2019-05-15

## 2019-05-13 RX ORDER — DOCUSATE SODIUM 100 MG
100 CAPSULE ORAL THREE TIMES A DAY
Refills: 0 | Status: DISCONTINUED | OUTPATIENT
Start: 2019-05-13 | End: 2019-05-15

## 2019-05-13 RX ADMIN — Medication 1 APPLICATION(S): at 20:04

## 2019-05-13 RX ADMIN — Medication 60 MILLIGRAM(S): at 22:06

## 2019-05-13 RX ADMIN — Medication 100 MILLIGRAM(S): at 13:34

## 2019-05-13 RX ADMIN — Medication 650 MILLIGRAM(S): at 17:40

## 2019-05-13 RX ADMIN — SODIUM CHLORIDE 125 MILLILITER(S): 9 INJECTION INTRAMUSCULAR; INTRAVENOUS; SUBCUTANEOUS at 12:36

## 2019-05-13 RX ADMIN — SODIUM CHLORIDE 125 MILLILITER(S): 9 INJECTION, SOLUTION INTRAVENOUS at 20:04

## 2019-05-13 RX ADMIN — ENOXAPARIN SODIUM 40 MILLIGRAM(S): 100 INJECTION SUBCUTANEOUS at 22:07

## 2019-05-13 RX ADMIN — SODIUM CHLORIDE 125 MILLILITER(S): 9 INJECTION, SOLUTION INTRAVENOUS at 16:26

## 2019-05-13 RX ADMIN — Medication 650 MILLIGRAM(S): at 16:25

## 2019-05-13 RX ADMIN — Medication 100 MILLIGRAM(S): at 22:07

## 2019-05-13 RX ADMIN — SODIUM CHLORIDE 125 MILLILITER(S): 9 INJECTION INTRAMUSCULAR; INTRAVENOUS; SUBCUTANEOUS at 13:34

## 2019-05-13 NOTE — CONSULT NOTE ADULT - SUBJECTIVE AND OBJECTIVE BOX
HPI: 53 F hx GERD, transferred from OSH at pt request. On 5/3       53 y.o. female with hx of acid reflux, reports right breast mass noted on mammogram in 2019, followed by MRI, biopsy, reports benign, pt denies changes in breast, nipple discharge, fatigue, reports breast tenderness, presents to PST for evaluation for [facility] - Right Breast Lumpectomy Post Bernarda  Reftector Placement, Right Axillary [place] Lymph Node Biopsy Possible Dissection, rIght Oncoplastic breast reconstruction, left breast reduction for symmetry on 19. On 5/10 she had fever to 101 and developed a diffuse rash on her face, legs, arms with associated swelling. She subsequently went to [facility] ED and was transferred here. (13 May 2019 12:40)      PAST MEDICAL & SURGICAL HISTORY:  Breast lump in female  Acid reflux  S/P cholecystectomy:   H/O abdominoplasty:   H/O: : , , 2006      Allergies    No Known Allergies    Intolerances        ANTIMICROBIALS:      OTHER MEDS:  acetaminophen   Tablet .. 650 milliGRAM(s) Oral every 6 hours PRN  aluminum hydroxide/magnesium hydroxide/simethicone Suspension 30 milliLiter(s) Oral every 4 hours PRN  calcium carbonate    500 mG (Tums) Chewable 3 Tablet(s) Chew every 6 hours PRN  docusate sodium 100 milliGRAM(s) Oral three times a day  enoxaparin Injectable 40 milliGRAM(s) SubCutaneous daily  lactated ringers. 1000 milliLiter(s) IV Continuous <Continuous>  ondansetron Injectable 4 milliGRAM(s) IV Push every 6 hours PRN  oxyCODONE    5 mG/acetaminophen 325 mG 1 Tablet(s) Oral every 4 hours PRN      SOCIAL HISTORY:    Marital Status:    Occupation:   Lives with:     Substance Use (street drugs):   Tobacco Usage:    Alcohol Usage: Social EtOH    FAMILY HISTORY:  FH: heart disease: father  FH: diabetes mellitus: father  Family history of rectal cancer: mother  FH: lymphoma: sister  Family history of breast cancer: sister, mother  FH: HTN (hypertension): mother, father      ROS:  Unobtainable because:   All other systems negative     Constitutional: no fever, no chills, no weight loss, no night sweats  Eye: no eye pain, no redness, no vision changes  ENT:  no sore throat, no rhinorrhea  Cardiovascular:  no chest pain, no palpitation  Respiratory:  no SOB, no cough  GI:  no abd pain, no vomiting, no diarrhea  urinary: no dysuria, no hematuria, no flank pain  : no  discharge or bleeding  musculoskeletal:  no joint pain, no joint swelling  skin:  no rash  neurology:  no headache, no seizure, no change in mental status  psych: no anxiety, no depression     Physical Exam:    General:    NAD, non toxic  Head: atraumatic, normocephalic  Eyes: normal sclera and conjunctiva  ENT:   no oropharyngeal lesions, no LAD, neck supple  Cardio:    regular S1,S2, no murmur  Respiratory:   clear b/l, no wheezing  abd:   soft, BS +, not tender, no hepatosplenomegaly  :     no CVAT, no suprapubic tenderness, no hickman  Musculoskeletal : no joint swelling, no edema  Skin:    no rash  vascular: no lines, normal pulses  Neurologic:     no focal deficits  psych: normal affect, no suicidal ideation      Drug Dosing Weight  Height (cm): 157.48 (13 May 2019 11:00)  Weight (kg): 88.66527714491084 (13 May 2019 11:00)  BMI (kg/m2): 35.8 (13 May 2019 11:00)  BSA (m2): 1.9 (13 May 2019 11:00)    Vital Signs Last 24 Hrs  T(F): 99.1 (19 @ 11:00), Max: 99.1 (19 @ 11:00)    Vital Signs Last 24 Hrs  HR: 108 (19 @ 11:00) (108 - 108)  BP: 109/65 (19 @ 11:00) (109/65 - 109/65)  RR: 21 (19 @ 11:00)  SpO2: 96% (19 @ 11:00) (96% - 96%)  Wt(kg): --                          12.2   15.35 )-----------( 352      ( 13 May 2019 12:01 )             38.8           150<H>  |  112<H>  |  7   ----------------------------<  99  4.3   |  20<L>  |  0.60    Ca    9.0      13 May 2019 12:01  Phos  3.0     05-13  Mg     1.8         TPro  6.3  /  Alb  3.1<L>  /  TBili  0.6  /  DBili  < 0.2  /  AST  17  /  ALT  17  /  AlkPhos  51            MICROBIOLOGY:  v              RADIOLOGY: HPI: 53 F hx GERD, transferred from OSH at pt request. On 5/3 had R breast lumpectomy and R LN bx w pathology showing R breast LCIS and nl LN. Also had left breast reduction on 5/3. Received ancef periop. D/c on oxycontin and no abx per pt. Was taking pain meds sat through tues and then again on thurs. Started noticing rash on friday, started on back of thighs and palms and proceeded to involve legs and then trunk and face. Has had MMR vaccine. Tried taking benadryl w no improvement. Also w fever 101F on saturday, took tylenol. No new detergents, foods, soaps, lotions. Some itching in hands/feet. No eye injection, oral ulcers/peeling, dysuria. On  fainted for about three minutes. EMS arrived and pts BP was low SBP 80s. Lives in [place] so went to OSH ED . Tmax at osh 100.6F. Had ct chest showing collection 6 x 1.7cm in R breast, likely post-op tho per report early abscess cannot be excluded. Given unasyn and vanco in ED and then vanco, clinda on floors before transfer to [facility] today.     Surgical sites w no discharge, malodor, worsening pain.       PAST MEDICAL & SURGICAL HISTORY:  Breast lump in female  Acid reflux  S/P cholecystectomy:   H/O abdominoplasty:   H/O: : , ,       Allergies    No Known Allergies    Intolerances        ANTIMICROBIALS:      OTHER MEDS:  acetaminophen   Tablet .. 650 milliGRAM(s) Oral every 6 hours PRN  aluminum hydroxide/magnesium hydroxide/simethicone Suspension 30 milliLiter(s) Oral every 4 hours PRN  calcium carbonate    500 mG (Tums) Chewable 3 Tablet(s) Chew every 6 hours PRN  docusate sodium 100 milliGRAM(s) Oral three times a day  enoxaparin Injectable 40 milliGRAM(s) SubCutaneous daily  lactated ringers. 1000 milliLiter(s) IV Continuous <Continuous>  ondansetron Injectable 4 milliGRAM(s) IV Push every 6 hours PRN  oxyCODONE    5 mG/acetaminophen 325 mG 1 Tablet(s) Oral every 4 hours PRN      SOCIAL HISTORY:  Lives with: family    Substance Use (street drugs): denies  Tobacco Usage:  denies  Alcohol Usage: denies    FAMILY HISTORY:  FH: heart disease: father  FH: diabetes mellitus: father  Family history of rectal cancer: mother  FH: lymphoma: sister  Family history of breast cancer: sister, mother  FH: HTN (hypertension): mother, father      ROS:   All other systems negative     Constitutional: fever, no chills  Eye: no eye pain, no redness  ENT:  no sore throat, no rhinorrhea  Cardiovascular:  no chest pain  Respiratory:  no SOB, no cough  GI:  no abd pain, no vomiting, no diarrhea  urinary: no dysuria  skin:   rash  neurology:  no headache    Physical Exam:    General:    NAD, non toxic  Head: atraumatic, normocephalic  Eyes: normal sclera and conjunctiva  ENT:   no oropharyngeal lesions, neck supple  Cardio:    regular S1,S2  Respiratory:   clear b/l, no wheezing  abd:   soft, BS +, not tender, no hepatosplenomegaly  Musculoskeletal : no joint swelling, some swelling of b/l hands  Skin:  maculopapular rash involving full body  Neurologic:     no focal deficits  psych: normal affect      Drug Dosing Weight  Height (cm): 157.48 (13 May 2019 11:00)  Weight (kg): 88.15508542119195 (13 May 2019 11:00)  BMI (kg/m2): 35.8 (13 May 2019 11:00)  BSA (m2): 1.9 (13 May 2019 11:00)    Vital Signs Last 24 Hrs  T(F): 99.1 (19 @ 11:00), Max: 99.1 (19 @ 11:00)    Vital Signs Last 24 Hrs  HR: 108 (19 @ 11:00) (108 - 108)  BP: 109/65 (19 @ 11:00) (109/65 - 109/65)  RR: 21 (19 @ 11:00)  SpO2: 96% (19 @ 11:00) (96% - 96%)  Wt(kg): --                          12.2   15.35 )-----------( 352      ( 13 May 2019 12:01 )             38.8       05-13    150<H>  |  112<H>  |  7   ----------------------------<  99  4.3   |  20<L>  |  0.60    Ca    9.0      13 May 2019 12:01  Phos  3.0     05-  Mg     1.8     05-    TPro  6.3  /  Alb  3.1<L>  /  TBili  0.6  /  DBili  < 0.2  /  AST  17  /  ALT  17  /  AlkPhos  51  05-          MICROBIOLOGY:      RADIOLOGY:

## 2019-05-13 NOTE — CONSULT NOTE ADULT - PROBLEM SELECTOR RECOMMENDATION 3
with hyperchloremia  - likely due to 0.9% saline infusion  - would change IVF to LR with hyperchloremia  - likely due to 0.9% saline infusion  - would change IVF to 0.45% NS with hyperchloremia  - likely due to 0.9% saline infusion  - change IVF to 0.45% NS

## 2019-05-13 NOTE — CONSULT NOTE ADULT - ASSESSMENT
52 yo F w/PMH of w/PMH of PILLO (2018), recently underwent right-sided lumpectomy and left-sided breast reduction surgery (5/3) who presents as a transfer from [facility] in [place] for fevers and diffuse rash

## 2019-05-13 NOTE — CONSULT NOTE ADULT - SUBJECTIVE AND OBJECTIVE BOX
Dermatology is consulted for rash and fever    HPI:  53 y.o. female with hx of GERD and lumpectomy admitted for fevers and rash. Patient had breast surgery on 5/3 and was discharged home. She notes that on 51/10 she started developing rash and fever. Notes pink swollen patches on the hands. Notes associated malaise. No respiratory symptoms. No sick contacts. Ancef was given to patient intraoperatively for her breast surgery about 10 days ago. Omeprazole has been taken for the past 1 month. No other antibiotics medications or OTC medications prior to the development of this rash. Has never had a rash like this before. Patient notes she lives in [place] and has dogs and owns a large plot of land, she is concerned about Lyme disease.  She does not recall any tick bites. No recent travel.         PAST MEDICAL & SURGICAL HISTORY:  Breast lump in female  Acid reflux  S/P cholecystectomy:   H/O abdominoplasty:   H/O: : , ,       REVIEW OF SYSTEMS    General: no fevers/chills, no lethargy	    Skin/Breast: see HPI  	  Ophthalmologic: no eye pain or change in vision  	  ENMT: no dysphagia or change in hearing    Respiratory and Thorax: no SOB or cough  	  Cardiovascular: no palpitations or chest pain    Gastrointestinal: no abdominal pain or blood in stool     Genitourinary: no dysuria or frequency    Musculoskeletal: no joint pains    Neurological: no weakness, numbness , or tingling    MEDICATIONS  (STANDING):  docusate sodium 100 milliGRAM(s) Oral three times a day  enoxaparin Injectable 40 milliGRAM(s) SubCutaneous daily  lactated ringers. 1000 milliLiter(s) (125 mL/Hr) IV Continuous <Continuous>    MEDICATIONS  (PRN):  acetaminophen   Tablet .. 650 milliGRAM(s) Oral every 6 hours PRN Temp greater or equal to 38C (100.4F), Mild Pain (1 - 3)  aluminum hydroxide/magnesium hydroxide/simethicone Suspension 30 milliLiter(s) Oral every 4 hours PRN Dyspepsia  calcium carbonate    500 mG (Tums) Chewable 3 Tablet(s) Chew every 6 hours PRN Dyspepsia  ondansetron Injectable 4 milliGRAM(s) IV Push every 6 hours PRN Nausea  oxyCODONE    5 mG/acetaminophen 325 mG 1 Tablet(s) Oral every 4 hours PRN Moderate Pain (4 - 6)      Allergies    No Known Allergies    Intolerances        SOCIAL HISTORY:    FAMILY HISTORY:  FH: heart disease: father  FH: diabetes mellitus: father  Family history of rectal cancer: mother  FH: lymphoma: sister  Family history of breast cancer: sister, mother  FH: HTN (hypertension): mother, father      Vital Signs Last 24 Hrs  T(C): 37.3 (13 May 2019 11:00), Max: 37.3 (13 May 2019 11:00)  T(F): 99.1 (13 May 2019 11:00), Max: 99.1 (13 May 2019 11:00)  HR: 108 (13 May 2019 11:) (108 - 108)  BP: 109/65 (13 May 2019 11:00) (109/65 - 109/65)  BP(mean): --  RR: 21 (13 May 2019 11:) (21 - 21)  SpO2: 96% (13 May 2019 11:) (96% - 96%)    PHYSICAL EXAM:     The patient was alert and oriented X 3, well nourished, and in no  apparent distress.  OP showed no ulcerations  There was no visible lymphadenopathy.  Conjunctiva were non injected  There was edema of the bilateral hands and feet as well as the ears   The scalp, hair, face, eyebrows, lips, OP, neck, chest, back,   extremities X 4, nails were examined.  There was no hyperhidrosis or bromhidrosis.    Of note on skin exam:   the bilateral arms, thighs, abdomen, back, legs, neck, and face with pink papules coalescing into plaques. Blanching erythema.     LABS:                        12.2   15.35 )-----------( 352      ( 13 May 2019 12:01 )             38.8     05-13    150<H>  |  112<H>  |  7   ----------------------------<  99  4.3   |  20<L>  |  0.60    Ca    9.0      13 May 2019 12:01  Phos  3.0     05-13  Mg     1.8     05-13    TPro  6.3  /  Alb  3.1<L>  /  TBili  0.6  /  DBili  < 0.2  /  AST  17  /  ALT  17  /  AlkPhos  51  05-13          RADIOLOGY & ADDITIONAL STUDIES:

## 2019-05-13 NOTE — CONSULT NOTE ADULT - ATTENDING COMMENTS
Melany Trejo MD  Pager: [phone]  After 5 PM or weekends please call fellow on call or office [phone]

## 2019-05-13 NOTE — H&P ADULT - NSHPPHYSICALEXAM_GEN_ALL_CORE
Gen: well appearing female, NAD  Breast: bilateral breasts soft, no palpable collections. Overlying skin without erythema, incision CDI,   Skin: Maculopapular rash on face, arms, legs, stomach, blanches, no discharge

## 2019-05-13 NOTE — H&P ADULT - ATTENDING COMMENTS
I saw the patient on May 13 at 18:30 at [facility]  She complains of three days of pigmented skin changes, fevers, and fatigue. She had a vasovagal episode at home.  She denies cp, sob, calf pain.  The patient has no breast complaints.     The breasts are soft.  The incisions are intact.  There is no fluctuance.  There is no erythema of the breasts.  There is no tenderness to palpation.     The patient and I had detailed discussion about potential etiologies.  Based on consultations by medicine, dermatology, and infectious disease, the leading diagnosis is a drug reaction.  The recommendations will be followed.

## 2019-05-13 NOTE — CONSULT NOTE ADULT - PROBLEM SELECTOR RECOMMENDATION 2
Patient with diffuse maculopapular rash most prominent on face, arms and legs  - differential includes drug reaction vs viral vs Sweet syndrome  - can continue PRN Benadryl for symptom-management  - consider dermatology consult Patient with diffuse maculopapular rash most prominent on face, arms and legs  - differential as above  - can continue PRN Benadryl for symptom-management  - recommend dermatology and ID consults

## 2019-05-13 NOTE — H&P ADULT - NSHPLABSRESULTS_GEN_ALL_CORE
CBC (05-13 @ 12:01)                              12.2                           15.35<H>  )----------------(  352        82.4<H>% Neutrophils, 14.5  % Lymphocytes, ANC: 12.13<H>                              38.8                  BMP (05-13 @ 12:01)             150<H>  |  112<H>  |  7     		Ca++ --      Ca 9.0                ---------------------------------( 99    		Mg 1.8                4.3     |  20<L>   |  0.60  			Ph 3.0       LFTs (05-13 @ 12:01)      TPro 6.3 / Alb 3.1<L> / TBili 0.6 / DBili < 0.2 / AST 17 / ALT 17 / AlkPhos 51

## 2019-05-13 NOTE — CONSULT NOTE ADULT - PROBLEM SELECTOR RECOMMENDATION 9
Patient with reported fevers to 101, afebrile here  - recommend full infectious work up including blood cultures, UA, urine culture, RVP and CXR  - can continue broad spectrum antibiotics in the interim  - may attempt to reach out to OSH for results of blood cultures taken there Patient with reported fevers to 101, afebrile here  - recommend full infectious work up including blood cultures, UA, urine culture, RVP and CXR  - can continue broad spectrum antibiotics in the interim  - may attempt to reach out to OSH for results of blood cultures taken there  - check HIV and measles titers Patient with reported fevers to 101, afebrile here. DDx is broad and includes viral exanthem, tickborne illness, potential bacterial infection, rheumatologic process.  - recommend full infectious work up including blood cultures, UA, urine culture, RVP and CXR  - recommend dermatology and ID consults; can initiate amp-sulbactam for empiric coverage of non-MDR organisms given patient's overall hemodynamic stability  - reach out tomorrow (48 hour shima) to OSH for results of blood cultures taken there  - check HIV and measles titers (IgG, IgM); lower suspicion for measles given reported history of immunization, but must exclude this as a possibility Patient with reported fevers to 101, afebrile here. DDx is broad and includes viral exanthem, tickborne illness, potential bacterial infection, rheumatologic process.  - recommend full infectious work up including blood cultures, UA, urine culture, RVP and CXR  - recommend dermatology and ID consults; will defer broad spectrum abx to ID in light of patient's overall hemodynamic stability  - reach out tomorrow (48 hour shima) to OSH for results of blood cultures taken there  - check HIV and measles titers (IgG, IgM); lower suspicion for measles given reported history of immunization, but must exclude this as a possibility

## 2019-05-13 NOTE — H&P ADULT - ASSESSMENT
53y f with diffuse rash/fevers 7 days post op from oncoplastic reduction and symmetrizing reduction. Breasts do not appear to be involved at this time given lack of significant exam findings on the breast  - admit  - dermatology consult  - medicine/ID consult  - will follow

## 2019-05-13 NOTE — CONSULT NOTE ADULT - ASSESSMENT
**INCOMPLETE***      1. Morbilliform drug eruption. Involving the trunk bilateral arms, legs, face. Does not meet criteria for DRESS. Normal LFTs, normal eosinophil count, normal creatinine. Offending agent for this eruption is likely Ancef vs Omeprazole. No other drug exposures prior to eruption per patient report.     -Please continue to monitor CBC CMP and fever curve   -Counseled patient on the nature and course of this condition. Will take 4-6 weeks to resolve and once improving will result in superficial peeling of the skin.   -Triamcinolone ointment 0.1% BID to the AA for the body. Please provide multiple tubes of ointment as they are dispensed only in small tubes which will not cover the surface area affected.   -Hydrocortisone ointment 2.5% BID for the affected areas on the face   -Please notify dermatology immediately if there is any oral, ocular, genital involvement or blistering   -Patient has declined biopsy today and will reconsider tomorrow       Patient to follow up at [facility] [address] [phone] when ready for discharge    Nohemi Monsivais MD   Dermatology Resident PGY-2   [phone] 1. Serum Sickness like reaction- given arthralgias, swelling, and urticarial papules and plaques- Morbilliform drug eruption also on the ddx. Involving the trunk bilateral arms, legs, face. Does not meet criteria for DRESS. Normal LFTs, normal eosinophil count, normal creatinine. Offending agent for this eruption is likely Ancef. No other drug exposures prior to eruption per patient report.     -Please continue to monitor CBC CMP and fever curve   -Start prednisone 60mg for 3 days, 40mg for 3 days, 20mg for 3 days, 10mg for 3 days then OFF  -Counseled patient on the nature and course of this condition. Will take 4-6 weeks to resolve and once improving will result in superficial peeling of the skin.   -Triamcinolone ointment 0.1% BID to the AA for the body. Please provide multiple tubes of ointment as they are dispensed only in small tubes which will not cover the surface area affected.   -Hydrocortisone ointment 2.5% BID for the affected areas on the face   -Antihistamines around the clock. Zyrtec 10mg BID   -Please notify dermatology immediately if there is any oral, ocular, genital involvement or blistering   -Punch biopsy done today- results take 5 business days     Dermatology Punch Biopsy Procedure Note    After risks and benefits of procedure including bleeding, infection and scar were reviewed (consents including photo consent reviewed, signed and in chart), allergies were reviewed and time out performed.       Area cleaned with rubbing alcohol and anesthetized with lidocaine and epinephrine.  A 4mm punch biopsy was performed to right thigh, hemostasis achieved with 4-0 Chromic gut sutures. Dressing with Vaseline. Wound care reviewed with patient and team.        Sutures are dissolvable, no need for removal. Please leave dressing on for 24-48 hours and after that please apply vaseline on the biopsy site 2-3 times daily to keep area moist and promote healing.     Nohemi Monsivais MD PGY-2  Dermatology Resident   Office: [phone]

## 2019-05-13 NOTE — H&P ADULT - HISTORY OF PRESENT ILLNESS
53 y.o. female with hx of acid reflux, reports right breast mass noted on mammogram in 02/2019, followed by MRI, biopsy, reports benign, pt denies changes in breast, nipple discharge, fatigue, reports breast tenderness, presents to PST for evaluation for [place] - Right Breast Lumpectomy Post Bernarda  Reftector Placement, Right Axillary [place] Lymph Node Biopsy Possible Dissection, Ight Oncoplastic breast reconstruction, left breast reduction for symmetry on 05/03/19. 53 y.o. female with hx of acid reflux, reports right breast mass noted on mammogram in 02/2019, followed by MRI, biopsy, reports benign, pt denies changes in breast, nipple discharge, fatigue, reports breast tenderness, presents to PST for evaluation for [place] - Right Breast Lumpectomy Post Bernarda  Reftector Placement, Right Axillary [place] Lymph Node Biopsy Possible Dissection, rIght Oncoplastic breast reconstruction, left breast reduction for symmetry on 05/03/19. On 5/10 she had fever to 101 and developed a diffuse rash on her face, legs, arms with associated swelling. She subsequently went to [facility] ED and was transferred here.

## 2019-05-14 LAB
ANION GAP SERPL CALC-SCNC: 12 MMO/L — SIGNIFICANT CHANGE UP (ref 7–14)
BUN SERPL-MCNC: 7 MG/DL — SIGNIFICANT CHANGE UP (ref 7–23)
CALCIUM SERPL-MCNC: 9 MG/DL — SIGNIFICANT CHANGE UP (ref 8.4–10.5)
CHLORIDE SERPL-SCNC: 107 MMOL/L — SIGNIFICANT CHANGE UP (ref 98–107)
CO2 SERPL-SCNC: 24 MMOL/L — SIGNIFICANT CHANGE UP (ref 22–31)
CREAT SERPL-MCNC: 0.58 MG/DL — SIGNIFICANT CHANGE UP (ref 0.5–1.3)
GLUCOSE SERPL-MCNC: 177 MG/DL — HIGH (ref 70–99)
HCT VFR BLD CALC: 33.4 % — LOW (ref 34.5–45)
HGB BLD-MCNC: 10.7 G/DL — LOW (ref 11.5–15.5)
MAGNESIUM SERPL-MCNC: 1.8 MG/DL — SIGNIFICANT CHANGE UP (ref 1.6–2.6)
MCHC RBC-ENTMCNC: 28.5 PG — SIGNIFICANT CHANGE UP (ref 27–34)
MCHC RBC-ENTMCNC: 32 % — SIGNIFICANT CHANGE UP (ref 32–36)
MCV RBC AUTO: 89.1 FL — SIGNIFICANT CHANGE UP (ref 80–100)
NRBC # FLD: 0 K/UL — SIGNIFICANT CHANGE UP (ref 0–0)
PHOSPHATE SERPL-MCNC: 3.1 MG/DL — SIGNIFICANT CHANGE UP (ref 2.5–4.5)
PLATELET # BLD AUTO: 318 K/UL — SIGNIFICANT CHANGE UP (ref 150–400)
PMV BLD: 9.4 FL — SIGNIFICANT CHANGE UP (ref 7–13)
POTASSIUM SERPL-MCNC: 3.9 MMOL/L — SIGNIFICANT CHANGE UP (ref 3.5–5.3)
POTASSIUM SERPL-SCNC: 3.9 MMOL/L — SIGNIFICANT CHANGE UP (ref 3.5–5.3)
RBC # BLD: 3.75 M/UL — LOW (ref 3.8–5.2)
RBC # FLD: 14.2 % — SIGNIFICANT CHANGE UP (ref 10.3–14.5)
SODIUM SERPL-SCNC: 143 MMOL/L — SIGNIFICANT CHANGE UP (ref 135–145)
SPECIMEN SOURCE: SIGNIFICANT CHANGE UP
SPECIMEN SOURCE: SIGNIFICANT CHANGE UP
WBC # BLD: 15.74 K/UL — HIGH (ref 3.8–10.5)
WBC # FLD AUTO: 15.74 K/UL — HIGH (ref 3.8–10.5)

## 2019-05-14 PROCEDURE — 99232 SBSQ HOSP IP/OBS MODERATE 35: CPT | Mod: GC

## 2019-05-14 PROCEDURE — 93010 ELECTROCARDIOGRAM REPORT: CPT

## 2019-05-14 RX ORDER — HYDROXYZINE HCL 10 MG
25 TABLET ORAL AT BEDTIME
Refills: 0 | Status: DISCONTINUED | OUTPATIENT
Start: 2019-05-14 | End: 2019-05-15

## 2019-05-14 RX ORDER — LORATADINE 10 MG/1
10 TABLET ORAL
Refills: 0 | Status: DISCONTINUED | OUTPATIENT
Start: 2019-05-14 | End: 2019-05-15

## 2019-05-14 RX ORDER — CETIRIZINE HYDROCHLORIDE 10 MG/1
10 TABLET ORAL
Refills: 0 | Status: DISCONTINUED | OUTPATIENT
Start: 2019-05-14 | End: 2019-05-15

## 2019-05-14 RX ADMIN — ENOXAPARIN SODIUM 40 MILLIGRAM(S): 100 INJECTION SUBCUTANEOUS at 11:24

## 2019-05-14 RX ADMIN — Medication 100 MILLIGRAM(S): at 22:34

## 2019-05-14 RX ADMIN — Medication 1 APPLICATION(S): at 09:18

## 2019-05-14 RX ADMIN — SODIUM CHLORIDE 125 MILLILITER(S): 9 INJECTION, SOLUTION INTRAVENOUS at 04:17

## 2019-05-14 RX ADMIN — Medication 1 APPLICATION(S): at 09:19

## 2019-05-14 RX ADMIN — Medication 100 MILLIGRAM(S): at 13:13

## 2019-05-14 RX ADMIN — Medication 25 MILLIGRAM(S): at 22:34

## 2019-05-14 RX ADMIN — Medication 60 MILLIGRAM(S): at 05:28

## 2019-05-14 RX ADMIN — Medication 1 APPLICATION(S): at 22:33

## 2019-05-14 RX ADMIN — Medication 1 APPLICATION(S): at 22:34

## 2019-05-14 RX ADMIN — Medication 100 MILLIGRAM(S): at 05:29

## 2019-05-14 RX ADMIN — SODIUM CHLORIDE 125 MILLILITER(S): 9 INJECTION, SOLUTION INTRAVENOUS at 07:45

## 2019-05-14 NOTE — PROGRESS NOTE ADULT - ATTENDING COMMENTS
Melany Trejo MD  Pager: [phone]  After 5 PM or weekends please call fellow on call or office [phone]

## 2019-05-14 NOTE — CHART NOTE - NSCHARTNOTEFT_GEN_A_CORE
Was paged by RN w/ concern of pt reported chest pain. Patient was immediately seen. On arrival to floor patient was ambulating with sister and in no acute distress. Vital signs all within normal limits. Patient stated she 'wasn't sure if it was chest pain, the rash, my breast surgery, or anxiety' and that the pain was no longer present. Patient reported no SOB and on exam had nonlabored respirations w/o retractions. Rash on chest unchanged. Pain not reproducible. Patient had difficulty describing pain, but did not radiate. Lower extremities w/o new edema, no size discrepency btw b/l legs, no pain with plantar flexion. EKG was obtained that showed right bundle branch block. No prior EKG's in system for comparison so Pt's PCP was contacted who confirmed this is NOT a new finding, but was diagnosed previously and appropriate work up was done several years ago. Cardiology was consulted and agree in the setting of resolved possible CP and stable vital signs no further cardiac workup needed. Will continue to monitor and will reevaluate if new symptoms present.

## 2019-05-14 NOTE — CHART NOTE - NSCHARTNOTEFT_GEN_A_CORE
Dermatology Brief Note    Overnight/Interim:  Patient notes she feels much better, swelling improved. Does note that swelling in the foot comes and goes   Rash is much better, her pain is improved   She has not gotten any anti-histamines yet     PHYSICAL EXAM:  Vital Signs Last 24 Hrs  T(C): 35 (14 May 2019 13:29), Max: 39.1 (13 May 2019 16:30)  T(F): 95 (14 May 2019 13:29), Max: 102.4 (13 May 2019 16:30)  HR: 86 (14 May 2019 13:29) (86 - 125)  BP: 126/62 (14 May 2019 13:29) (96/68 - 146/88)  BP(mean): --  RR: 20 (14 May 2019 13:29) (18 - 21)  SpO2: 95% (14 May 2019 13:29) (95% - 98%)    PHYSICAL EXAM:     The patient was alert and oriented X 3, well nourished, and in no  apparent distress.  OP showed no ulcerations  There was no visible lymphadenopathy.  Conjunctiva were non injected  There was improved edema of the bilateral hands and feet   The scalp, hair, face, eyebrows, lips, OP, neck, chest, back,   extremities X 4, nails were examined.  There was no hyperhidrosis or bromhidrosis.    Of note on skin exam:   the bilateral arms, thighs, abdomen, back, legs, neck, and face with faintly pink papules- overall greatly improved      ASSESSMENT/PLAN:    1. Serum Sickness like reaction- given arthralgias, swelling, and urticarial papules and plaques- Morbilliform drug eruption also on the ddx. Involving the trunk bilateral arms, legs, face. Greatly improved today with prednisone. Swelling improved. Response to prednisone appears to be more consistent with serum sickness like reaction, advised patient on the waxing and waning nature of the rash even with the prednisone on board.   -List Ancef as allergy for patient   -Please continue to monitor CBC CMP and fever curve   -Continue 60mg for 3 days, 40mg for 3 days, 20mg for 3 days, 10mg for 3 days then OFF  -Triamcinolone ointment 0.1% BID to the AA for the body. Please provide multiple tubes of ointment as they are dispensed only in small tubes which will not cover the surface area affected.   -Hydrocortisone ointment 2.5% BID for the affected areas on the face   -Antihistamines around the clock. Zyrtec 10mg in the morning and afternoon, and Hydroxyzine 25mg at bedtime   -Please notify dermatology immediately if there is any oral, ocular, genital involvement or blistering   -Punch biopsy results pending    Discussed with primary team.  Discussed with attending, Dr. Barlow. Formal rounds to be conducted on 5/15/19 Will update assessment and plan that time.    Nicole Hodges MD  PGY2, Dermatology

## 2019-05-14 NOTE — PROVIDER CONTACT NOTE (OTHER) - SITUATION
Patient C/O  of chest discomfort, and left calf pain when ambulating Patient C/O  of chest discomfort but patient unable to describe discomfort feeling, and left calf pain when ambulating

## 2019-05-15 ENCOUNTER — TRANSCRIPTION ENCOUNTER (OUTPATIENT)
Age: 54
End: 2019-05-15

## 2019-05-15 VITALS
RESPIRATION RATE: 16 BRPM | SYSTOLIC BLOOD PRESSURE: 114 MMHG | OXYGEN SATURATION: 98 % | DIASTOLIC BLOOD PRESSURE: 60 MMHG | TEMPERATURE: 99 F | HEART RATE: 84 BPM

## 2019-05-15 LAB
ALBUMIN SERPL ELPH-MCNC: 3.4 G/DL — SIGNIFICANT CHANGE UP (ref 3.3–5)
ALP SERPL-CCNC: 52 U/L — SIGNIFICANT CHANGE UP (ref 40–120)
ALT FLD-CCNC: 19 U/L — SIGNIFICANT CHANGE UP (ref 4–33)
ANION GAP SERPL CALC-SCNC: 13 MMO/L — SIGNIFICANT CHANGE UP (ref 7–14)
AST SERPL-CCNC: 15 U/L — SIGNIFICANT CHANGE UP (ref 4–32)
BACTERIA UR CULT: SIGNIFICANT CHANGE UP
BILIRUB SERPL-MCNC: 0.2 MG/DL — SIGNIFICANT CHANGE UP (ref 0.2–1.2)
BUN SERPL-MCNC: 10 MG/DL — SIGNIFICANT CHANGE UP (ref 7–23)
CALCIUM SERPL-MCNC: 8.9 MG/DL — SIGNIFICANT CHANGE UP (ref 8.4–10.5)
CHLORIDE SERPL-SCNC: 105 MMOL/L — SIGNIFICANT CHANGE UP (ref 98–107)
CO2 SERPL-SCNC: 24 MMOL/L — SIGNIFICANT CHANGE UP (ref 22–31)
CREAT SERPL-MCNC: 0.76 MG/DL — SIGNIFICANT CHANGE UP (ref 0.5–1.3)
GLUCOSE SERPL-MCNC: 210 MG/DL — HIGH (ref 70–99)
HCT VFR BLD CALC: 31.3 % — LOW (ref 34.5–45)
HGB BLD-MCNC: 10 G/DL — LOW (ref 11.5–15.5)
MCHC RBC-ENTMCNC: 28.7 PG — SIGNIFICANT CHANGE UP (ref 27–34)
MCHC RBC-ENTMCNC: 31.9 % — LOW (ref 32–36)
MCV RBC AUTO: 89.7 FL — SIGNIFICANT CHANGE UP (ref 80–100)
NRBC # FLD: 0.02 K/UL — SIGNIFICANT CHANGE UP (ref 0–0)
PLATELET # BLD AUTO: 370 K/UL — SIGNIFICANT CHANGE UP (ref 150–400)
PMV BLD: 9.2 FL — SIGNIFICANT CHANGE UP (ref 7–13)
POTASSIUM SERPL-MCNC: 3.6 MMOL/L — SIGNIFICANT CHANGE UP (ref 3.5–5.3)
POTASSIUM SERPL-SCNC: 3.6 MMOL/L — SIGNIFICANT CHANGE UP (ref 3.5–5.3)
PROT SERPL-MCNC: 6.3 G/DL — SIGNIFICANT CHANGE UP (ref 6–8.3)
RBC # BLD: 3.49 M/UL — LOW (ref 3.8–5.2)
RBC # FLD: 14.3 % — SIGNIFICANT CHANGE UP (ref 10.3–14.5)
SODIUM SERPL-SCNC: 142 MMOL/L — SIGNIFICANT CHANGE UP (ref 135–145)
SPECIMEN SOURCE: SIGNIFICANT CHANGE UP
WBC # BLD: 13.39 K/UL — HIGH (ref 3.8–10.5)
WBC # FLD AUTO: 13.39 K/UL — HIGH (ref 3.8–10.5)

## 2019-05-15 PROCEDURE — 99232 SBSQ HOSP IP/OBS MODERATE 35: CPT

## 2019-05-15 PROCEDURE — 99233 SBSQ HOSP IP/OBS HIGH 50: CPT

## 2019-05-15 RX ORDER — DIPHENHYDRAMINE HCL 50 MG
50 CAPSULE ORAL EVERY 4 HOURS
Refills: 0 | Status: DISCONTINUED | OUTPATIENT
Start: 2019-05-15 | End: 2019-05-15

## 2019-05-15 RX ORDER — CETIRIZINE HYDROCHLORIDE 10 MG/1
1 TABLET ORAL
Qty: 20 | Refills: 0
Start: 2019-05-15 | End: 2019-05-24

## 2019-05-15 RX ORDER — HYDROXYZINE HCL 10 MG
1 TABLET ORAL
Qty: 10 | Refills: 0
Start: 2019-05-15 | End: 2019-05-24

## 2019-05-15 RX ORDER — HYDROCORTISONE 1 %
1 OINTMENT (GRAM) TOPICAL
Qty: 120 | Refills: 0
Start: 2019-05-15

## 2019-05-15 RX ADMIN — Medication 100 MILLIGRAM(S): at 05:42

## 2019-05-15 RX ADMIN — Medication 100 MILLIGRAM(S): at 13:28

## 2019-05-15 RX ADMIN — CETIRIZINE HYDROCHLORIDE 10 MILLIGRAM(S): 10 TABLET ORAL at 05:41

## 2019-05-15 RX ADMIN — Medication 1 APPLICATION(S): at 07:57

## 2019-05-15 RX ADMIN — ENOXAPARIN SODIUM 40 MILLIGRAM(S): 100 INJECTION SUBCUTANEOUS at 12:39

## 2019-05-15 RX ADMIN — Medication 60 MILLIGRAM(S): at 05:42

## 2019-05-15 RX ADMIN — SODIUM CHLORIDE 125 MILLILITER(S): 9 INJECTION, SOLUTION INTRAVENOUS at 05:41

## 2019-05-15 NOTE — DISCHARGE NOTE NURSING/CASE MANAGEMENT/SOCIAL WORK - NSDCDPATPORTLINK_GEN_ALL_CORE
You can access the [facility] Patient Portal, offered by [facility], by registering with the following website: [email or web address]

## 2019-05-15 NOTE — DISCHARGE NOTE PROVIDER - CARE PROVIDER_API CALL
Jg Valentin; ROBBIE)  Otolaryngology; Plastic Surgery  [address] [place] [address] [address]  [place] [address]  Phone: [phone]  Fax: [phone]  Follow Up Time:

## 2019-05-15 NOTE — DISCHARGE NOTE PROVIDER - CARE PROVIDERS DIRECT ADDRESSES
[email or web address]

## 2019-05-15 NOTE — PROVIDER CONTACT NOTE (OTHER) - BACKGROUND
S/P Right lymph node removal; left breast reduction
Admitted for infection.
Patient is s/p 5/3 R lymph node removal and left breast reduction.
Patient s/p 5/3 lymph node removal and left breast reduction

## 2019-05-15 NOTE — PROVIDER CONTACT NOTE (OTHER) - SITUATION
Patient c/o left hand tingling . Patient also c/o that the bruised area on her left inner hand is changing color, it went from black to red.

## 2019-05-15 NOTE — PROVIDER CONTACT NOTE (OTHER) - ACTION/TREATMENT ORDERED:
Please get EKG, will place order
Hydrocortisone and triamcinolone ointment applied as ordered. Patient also received hydroxyzine hydrochloride as ordered.
MD Cadena #[id number] spoke to patient . Will continue to monitor. Bruised area marked off.
PA aware. Tylenol given as ordered.

## 2019-05-15 NOTE — DISCHARGE NOTE PROVIDER - HOSPITAL COURSE
Patient is a 53F s/p lumpectomy w/ SLNBx, right oncoplastic breast reduction and left breast reduction on 5/3/2019. On 5/10 pt presented to OSH w/ fever and diffuse body rash. She was subsequently [facility] and directly admitted. Dermatology, Infectious Disease, and Medicine were consulted. Patient was monitored closely and responded to recommended dermatology interventions. At the time of discharge, the patient was hemodynamically stable, was tolerating PO diet, was ambulating, and was comfortable with adequate pain control.

## 2019-05-15 NOTE — PROVIDER CONTACT NOTE (OTHER) - ASSESSMENT
Anesthetic History   No history of anesthetic complications            Review of Systems / Medical History  Patient summary reviewed, nursing notes reviewed and pertinent labs reviewed    Pulmonary  Within defined limits                 Neuro/Psych         Psychiatric history     Cardiovascular  Within defined limits                Exercise tolerance: >4 METS     GI/Hepatic/Renal  Within defined limits              Endo/Other      Hypothyroidism  Arthritis and cancer (thyroid)     Other Findings              Physical Exam    Airway  Mallampati: II  TM Distance: 4 - 6 cm  Neck ROM: normal range of motion   Mouth opening: Normal     Cardiovascular    Rhythm: regular  Rate: normal         Dental    Dentition: Upper dentition intact and Lower dentition intact     Pulmonary  Breath sounds clear to auscultation               Abdominal         Other Findings            Anesthetic Plan    ASA: 3  Anesthesia type: epidural          Induction: Intravenous  Anesthetic plan and risks discussed with: Patient
Awake and alert no acute distress noted
AOX3, OH=572.4, WW=151, CZ=037/48, (+) Generalized facial and body rash, (+) warm to touch.
Patient axox4. VSS. Generalized rash and itching has subsided after application of the ointments and po medications. Patient continues with ivf infusion.
Patient axox4. Very anxious. Right breast and axilla surgical site c/d/i. Generalized redness continues. Left breast incision dry and intact. VSS. Patient continues with ivf infusing via left peripheral IV.

## 2019-05-15 NOTE — PROGRESS NOTE ADULT - SUBJECTIVE AND OBJECTIVE BOX
INTERVAL HPI/OVERNIGHT EVENTS:  Patient notes she feels much better, swelling improved. Had a flare up of the rash on the chest and feet last night, has since resolved. Notes she had significant itching last night.   Currently rash is much better and swelling much better  Got her first dose of antihistamines last night   Difficulty sleeping due to discomfort     MEDICATIONS  (STANDING):  cetirizine 10 milliGRAM(s) Oral <User Schedule>  docusate sodium 100 milliGRAM(s) Oral three times a day  enoxaparin Injectable 40 milliGRAM(s) SubCutaneous daily  hydrocortisone 2.5% Ointment 1 Application(s) Topical two times a day  hydrOXYzine hydrochloride 25 milliGRAM(s) Oral at bedtime  loratadine 10 milliGRAM(s) Oral two times a day  predniSONE   Tablet 60 milliGRAM(s) Oral daily  predniSONE   Tablet   Oral   triamcinolone 0.1% Ointment 1 Application(s) Topical two times a day    MEDICATIONS  (PRN):  acetaminophen   Tablet .. 650 milliGRAM(s) Oral every 6 hours PRN Temp greater or equal to 38C (100.4F), Mild Pain (1 - 3)  aluminum hydroxide/magnesium hydroxide/simethicone Suspension 30 milliLiter(s) Oral every 4 hours PRN Dyspepsia  calcium carbonate    500 mG (Tums) Chewable 3 Tablet(s) Chew every 6 hours PRN Dyspepsia  diphenhydrAMINE 50 milliGRAM(s) Oral every 4 hours PRN Rash and/or Itching  ondansetron Injectable 4 milliGRAM(s) IV Push every 6 hours PRN Nausea  oxyCODONE    5 mG/acetaminophen 325 mG 1 Tablet(s) Oral every 4 hours PRN Moderate Pain (4 - 6)      Allergies    Ancef (Fever; Rash)    Intolerances    cefazolin (Other)      REVIEW OF SYSTEMS      General: no fevers/chills, no NS	    Skin: see HPI  	  Ophthalmologic: no eye pain or change in vision    Genitourinary: no dysuria or hematuria    Musculoskeletal: no joint pains or weakness	    Neurological: no weakness or tingling          Vital Signs Last 24 Hrs  T(C): 37 (15 May 2019 12:43), Max: 37.7 (15 May 2019 10:47)  T(F): 98.6 (15 May 2019 12:43), Max: 99.8 (15 May 2019 10:47)  HR: 84 (15 May 2019 12:43) (84 - 100)  BP: 114/60 (15 May 2019 12:43) (114/60 - 152/94)  BP(mean): --  RR: 16 (15 May 2019 12:43) (16 - 20)  SpO2: 98% (15 May 2019 12:43) (96% - 99%)    Physical Exam:  The patient was alert and oriented X 3, well nourished, and in no  apparent distress.  OP showed no ulcerations  There was no visible lymphadenopathy.  Conjunctiva were non injected  There was improved edema of the bilateral hands and feet   The scalp, hair, face, eyebrows, lips, OP, neck, chest, back,   extremities X 4, nails were examined.  There was no hyperhidrosis or bromhidrosis.    Of note on skin exam:   the bilateral arms, thighs, abdomen, back, legs, neck, and face with faintly pink papules- overall greatly improved      right thigh biopsy site c/d/i    LABS:                        10.0   13.39 )-----------( 370      ( 15 May 2019 09:41 )             31.3     05-15    142  |  105  |  10  ----------------------------<  210<H>  3.6   |  24  |  0.76    Ca    8.9      15 May 2019 09:41  Phos  3.1     05-14  Mg     1.8     05-14    TPro  6.3  /  Alb  3.4  /  TBili  0.2  /  DBili  x   /  AST  15  /  ALT  19  /  AlkPhos  52  05-15      Urinalysis Basic - ( 13 May 2019 19:50 )    Color: LIGHT YELLOW / Appearance: CLEAR / S.010 / pH: 7.5  Gluc: NEGATIVE / Ketone: NEGATIVE  / Bili: NEGATIVE / Urobili: NORMAL   Blood: NEGATIVE / Protein: 10 / Nitrite: NEGATIVE   Leuk Esterase: NEGATIVE / RBC: x / WBC x   Sq Epi: x / Non Sq Epi: x / Bacteria: x        RADIOLOGY & ADDITIONAL TESTS:
Follow Up:  fevers,  rash    Interval History: Started on prednisone taper 5/13, rash much improved. Feels better.  Called [facility] - blood cultures drawn there 5/12/19 no growth.      Allergies  Ancef (Fever; Rash)      ANTIMICROBIALS:      OTHER MEDS:  acetaminophen   Tablet .. 650 milliGRAM(s) Oral every 6 hours PRN  aluminum hydroxide/magnesium hydroxide/simethicone Suspension 30 milliLiter(s) Oral every 4 hours PRN  calcium carbonate    500 mG (Tums) Chewable 3 Tablet(s) Chew every 6 hours PRN  docusate sodium 100 milliGRAM(s) Oral three times a day  enoxaparin Injectable 40 milliGRAM(s) SubCutaneous daily  hydrocortisone 2.5% Ointment 1 Application(s) Topical two times a day  hydrOXYzine hydrochloride 25 milliGRAM(s) Oral at bedtime  loratadine 10 milliGRAM(s) Oral two times a day  ondansetron Injectable 4 milliGRAM(s) IV Push every 6 hours PRN  oxyCODONE    5 mG/acetaminophen 325 mG 1 Tablet(s) Oral every 4 hours PRN  predniSONE   Tablet 60 milliGRAM(s) Oral daily  predniSONE   Tablet   Oral   sodium chloride 0.45%. 1000 milliLiter(s) IV Continuous <Continuous>  triamcinolone 0.1% Ointment 1 Application(s) Topical two times a day      Vital Signs Last 24 Hrs  T(F): 98.6 (05-15-19 @ 12:43), Max: 99.8 (05-15-19 @ 10:47)  HR: 84 (05-15-19 @ 12:43)  BP: 114/60 (05-15-19 @ 12:43)  RR: 16 (05-15-19 @ 12:43)  SpO2: 98% (05-15-19 @ 12:43) (96% - 99%)    Physical Exam:  General:    NAD,  non toxic, A&O x 3  Head: atraumatic, normocephalic  Eye: normal sclera and conjunctiva  ENT:   neck supple  Cardio:     regular S1, S2  Respiratory:    clear b/l,    no wheezing  abd:     soft,   BS +,   no tenderness,    no organomegaly  Musculoskeletal:   no joint swelling,   no edema  Skin:   maculopapular rash much improved w less erythema, less extensive, no swelling hands, feet.  Neurologic:     no focal deficit                           10.0   13.39 )-----------( 370      ( 15 May 2019 09:41 )             31.3 05-15    142  |  105  |  10  ----------------------------<  210  3.6   |  24  |  0.76  Ca    8.9      15 May 2019 09:41Phos  3.1     05-14Mg     1.8     05-14  TPro  6.3  /  Alb  3.4  /  TBili  0.2  /  DBili  x   /  AST  15  /  ALT  19  /  AlkPhos  52  05-15                  MICROBIOLOGY:  Culture - Blood (05.13.19 @ 18:29)    Culture - Blood:   NO ORGANISMS ISOLATED  NO ORGANISMS ISOLATED AT 24 HOURS    Specimen Source: BLOOD PERIPHERAL    Culture - Blood (05.13.19 @ 18:29)    Culture - Blood:   NO ORGANISMS ISOLATED  NO ORGANISMS ISOLATED AT 24 HOURS    Specimen Source: BLOOD VENOUS        path: testing        RADIOLOGY:
Follow Up:  fevers,  rash    Interval History: Started on prednisone taper yday, rash much improved. WBC improving. T max 100F overnight. Pt states more energy today.      Allergies  Ancef (Fever; Rash)      ANTIMICROBIALS:      OTHER MEDS:  acetaminophen   Tablet .. 650 milliGRAM(s) Oral every 6 hours PRN  aluminum hydroxide/magnesium hydroxide/simethicone Suspension 30 milliLiter(s) Oral every 4 hours PRN  calcium carbonate    500 mG (Tums) Chewable 3 Tablet(s) Chew every 6 hours PRN  docusate sodium 100 milliGRAM(s) Oral three times a day  enoxaparin Injectable 40 milliGRAM(s) SubCutaneous daily  hydrocortisone 2.5% Ointment 1 Application(s) Topical two times a day  hydrOXYzine hydrochloride 25 milliGRAM(s) Oral at bedtime  loratadine 10 milliGRAM(s) Oral two times a day  ondansetron Injectable 4 milliGRAM(s) IV Push every 6 hours PRN  oxyCODONE    5 mG/acetaminophen 325 mG 1 Tablet(s) Oral every 4 hours PRN  predniSONE   Tablet 60 milliGRAM(s) Oral daily  predniSONE   Tablet   Oral   sodium chloride 0.45%. 1000 milliLiter(s) IV Continuous <Continuous>  triamcinolone 0.1% Ointment 1 Application(s) Topical two times a day      Vital Signs Last 24 Hrs  T(C): 36.7 (14 May 2019 17:15), Max: 37.8 (14 May 2019 01:22)  T(F): 98 (14 May 2019 17:15), Max: 100 (14 May 2019 01:22)  HR: 88 (14 May 2019 17:15) (86 - 107)  BP: 134/68 (14 May 2019 17:15) (96/68 - 146/88)  BP(mean): --  RR: 20 (14 May 2019 17:15) (18 - 21)  SpO2: 97% (14 May 2019 17:15) (95% - 98%)    Physical Exam:  General:    NAD,  non toxic, A&O x 3  Head: atraumatic, normocephalic  Eye: normal sclera and conjunctiva  ENT:   neck supple  Cardio:     regular S1, S2  Respiratory:    clear b/l,    no wheezing  abd:     soft,   BS +,   no tenderness,    no organomegaly  Musculoskeletal:   no joint swelling,   no edema  Skin:   maculopapular rash much improved w less erythema, less extensive  Neurologic:     no focal deficit                          10.7   15.74 )-----------( 318      ( 14 May 2019 05:35 )             33.4       05-14    143  |  107  |  7   ----------------------------<  177<H>  3.9   |  24  |  0.58    Ca    9.0      14 May 2019 05:35  Phos  3.1     -  Mg     1.8         TPro  6.3  /  Alb  3.1<L>  /  TBili  0.6  /  DBili  < 0.2  /  AST  17  /  ALT  17  /  AlkPhos  51  05-      Urinalysis Basic - ( 13 May 2019 19:50 )    Color: LIGHT YELLOW / Appearance: CLEAR / S.010 / pH: 7.5  Gluc: NEGATIVE / Ketone: NEGATIVE  / Bili: NEGATIVE / Urobili: NORMAL   Blood: NEGATIVE / Protein: 10 / Nitrite: NEGATIVE   Leuk Esterase: NEGATIVE / RBC: x / WBC x   Sq Epi: x / Non Sq Epi: x / Bacteria: x        MICROBIOLOGY:    Culture - Blood (collected 13 May 2019 18:29)  Source: BLOOD PERIPHERAL  Preliminary Report (14 May 2019 18:31):    NO ORGANISMS ISOLATED    NO ORGANISMS ISOLATED AT 24 HOURS    Culture - Blood (collected 13 May 2019 18:29)  Source: BLOOD VENOUS  Preliminary Report (14 May 2019 18:31):    NO ORGANISMS ISOLATED    NO ORGANISMS ISOLATED AT 24 HOURS      RADIOLOGY:
Plastic Surgery Progress Note (pg [facility]: [id number], [facility]: [phone])    SUBJECTIVE:  Patient seen and examined at bedside this AM. No acute events overnight. Punch biopsy performed bedside by Dermatology. AF/VSS currently. Last fever 39.1 at 1630. Patient feels subjectively much better this am. Has started steroids per Derm and topical treatment.     OBJECTIVE:     ** VITAL SIGNS / I&O's **    Vital Signs Last 24 Hrs  T(C): 37 (14 May 2019 08:51), Max: 39.1 (13 May 2019 16:30)  T(F): 98.6 (14 May 2019 08:51), Max: 102.4 (13 May 2019 16:30)  HR: 100 (14 May 2019 08:51) (95 - 125)  BP: 146/88 (14 May 2019 08:51) (96/68 - 146/88)  BP(mean): --  RR: 19 (14 May 2019 08:51) (18 - 21)  SpO2: 96% (14 May 2019 08:51) (95% - 98%)      13 May 2019 07:01  -  14 May 2019 07:00  --------------------------------------------------------  IN:  Total IN: 0 mL    OUT:    Voided: 1600 mL  Total OUT: 1600 mL    Total NET: -1600 mL      Gen: well appearing female, NAD  	Breast: bilateral breasts soft, no palpable collections. Overlying skin with maculopapular rash consistent with other areas, incision CDI,   Skin: Maculopapular rash on face, arms, legs, stomach, blanches, no discharge- overall decreased from yesterday      **MEDS**  acetaminophen   Tablet .. 650 milliGRAM(s) Oral every 6 hours PRN  aluminum hydroxide/magnesium hydroxide/simethicone Suspension 30 milliLiter(s) Oral every 4 hours PRN  calcium carbonate    500 mG (Tums) Chewable 3 Tablet(s) Chew every 6 hours PRN  docusate sodium 100 milliGRAM(s) Oral three times a day  enoxaparin Injectable 40 milliGRAM(s) SubCutaneous daily  hydrocortisone 2.5% Ointment 1 Application(s) Topical two times a day  ondansetron Injectable 4 milliGRAM(s) IV Push every 6 hours PRN  oxyCODONE    5 mG/acetaminophen 325 mG 1 Tablet(s) Oral every 4 hours PRN  predniSONE   Tablet 60 milliGRAM(s) Oral daily  predniSONE   Tablet   Oral   sodium chloride 0.45%. 1000 milliLiter(s) IV Continuous <Continuous>  triamcinolone 0.1% Ointment 1 Application(s) Topical two times a day      ** LABS **                          10.7   15.74 )-----------( 318      ( 14 May 2019 05:35 )             33.4     14 May 2019 05:35    143    |  107    |  7      ----------------------------<  177    3.9     |  24     |  0.58     Ca    9.0        14 May 2019 05:35  Phos  3.1       14 May 2019 05:35  Mg     1.8       14 May 2019 05:35    TPro  6.3    /  Alb  3.1    /  TBili  0.6    /  DBili  < 0.2  /  AST  17     /  ALT  17     /  AlkPhos  51     13 May 2019 12:01      CAPILLARY BLOOD GLUCOSE
Plastic Surgery Progress Note (pg [facility]: [id number], [facility]: [phone])    SUBJECTIVE:  Had pruritis overnight. Feels much better now.  Vital Signs Last 24 Hrs  T(C): 37.2 (05-15-19 @ 05:36), Max: 37.2 (05-14-19 @ 11:45)  T(F): 99 (05-15-19 @ 05:36), Max: 99 (05-15-19 @ 05:36)  HR: 100 (05-15-19 @ 05:36) (86 - 100)  BP: 129/60 (05-15-19 @ 05:36) (119/55 - 139/69)  BP(mean): --  RR: 20 (05-15-19 @ 05:36) (20 - 21)  SpO2: 96% (05-15-19 @ 05:36) (95% - 97%)  I&O's Detail    14 May 2019 07:01  -  15 May 2019 07:00  --------------------------------------------------------  IN:    sodium chloride 0.45%: 2875 mL  Total IN: 2875 mL    OUT:    Voided: 1400 mL  Total OUT: 1400 mL    Total NET: 1475 mL      Gen: well appearing female, NAD, faint malar rash  	Breast: bilateral breasts soft, no palpable collections. Overlying skin with maculopapular rash consistent with other areas, incision CDI,   Skin: Maculopapular rash on face, arms, legs, stomach, blanches, no discharge- overall stable from yesterday

## 2019-05-16 ENCOUNTER — APPOINTMENT (OUTPATIENT)
Dept: SURGICAL ONCOLOGY | Facility: CLINIC | Age: 54
End: 2019-05-16

## 2019-05-18 LAB
BACTERIA BLD CULT: SIGNIFICANT CHANGE UP
BACTERIA BLD CULT: SIGNIFICANT CHANGE UP

## 2019-05-23 ENCOUNTER — APPOINTMENT (OUTPATIENT)
Dept: PLASTIC SURGERY | Facility: CLINIC | Age: 54
End: 2019-05-23
Payer: COMMERCIAL

## 2019-05-23 PROCEDURE — 99024 POSTOP FOLLOW-UP VISIT: CPT

## 2019-05-23 NOTE — REASON FOR VISIT
[FreeTextEntry1] :  DOS: 05/03/2019 right onconplastic breast reconstruction left breast reduction for symmetry. pt is doing well, better w/ time.

## 2019-06-24 ENCOUNTER — APPOINTMENT (OUTPATIENT)
Dept: PLASTIC SURGERY | Facility: CLINIC | Age: 54
End: 2019-06-24

## 2019-08-22 ENCOUNTER — APPOINTMENT (OUTPATIENT)
Dept: PLASTIC SURGERY | Facility: CLINIC | Age: 54
End: 2019-08-22
Payer: COMMERCIAL

## 2019-08-22 ENCOUNTER — OUTPATIENT (OUTPATIENT)
Dept: OUTPATIENT SERVICES | Facility: HOSPITAL | Age: 54
LOS: 1 days | End: 2019-08-22

## 2019-08-22 VITALS
HEIGHT: 63 IN | WEIGHT: 192.9 LBS | OXYGEN SATURATION: 96 % | DIASTOLIC BLOOD PRESSURE: 78 MMHG | RESPIRATION RATE: 14 BRPM | SYSTOLIC BLOOD PRESSURE: 110 MMHG | TEMPERATURE: 98 F | HEART RATE: 79 BPM

## 2019-08-22 DIAGNOSIS — D05.01 LOBULAR CARCINOMA IN SITU OF RIGHT BREAST: ICD-10-CM

## 2019-08-22 DIAGNOSIS — Z98.890 OTHER SPECIFIED POSTPROCEDURAL STATES: Chronic | ICD-10-CM

## 2019-08-22 DIAGNOSIS — C50.919 MALIGNANT NEOPLASM OF UNSPECIFIED SITE OF UNSPECIFIED FEMALE BREAST: ICD-10-CM

## 2019-08-22 DIAGNOSIS — Z90.49 ACQUIRED ABSENCE OF OTHER SPECIFIED PARTS OF DIGESTIVE TRACT: Chronic | ICD-10-CM

## 2019-08-22 DIAGNOSIS — Z98.891 HISTORY OF UTERINE SCAR FROM PREVIOUS SURGERY: Chronic | ICD-10-CM

## 2019-08-22 DIAGNOSIS — Z42.1 ENCOUNTER FOR BREAST RECONSTRUCTION FOLLOWING MASTECTOMY: ICD-10-CM

## 2019-08-22 DIAGNOSIS — Z90.89 ACQUIRED ABSENCE OF OTHER ORGANS: Chronic | ICD-10-CM

## 2019-08-22 LAB
ANION GAP SERPL CALC-SCNC: 15 MMO/L — HIGH (ref 7–14)
BLD GP AB SCN SERPL QL: NEGATIVE — SIGNIFICANT CHANGE UP
BUN SERPL-MCNC: 18 MG/DL — SIGNIFICANT CHANGE UP (ref 7–23)
CALCIUM SERPL-MCNC: 10 MG/DL — SIGNIFICANT CHANGE UP (ref 8.4–10.5)
CHLORIDE SERPL-SCNC: 103 MMOL/L — SIGNIFICANT CHANGE UP (ref 98–107)
CO2 SERPL-SCNC: 26 MMOL/L — SIGNIFICANT CHANGE UP (ref 22–31)
CREAT SERPL-MCNC: 0.52 MG/DL — SIGNIFICANT CHANGE UP (ref 0.5–1.3)
GLUCOSE SERPL-MCNC: 77 MG/DL — SIGNIFICANT CHANGE UP (ref 70–99)
HCG SERPL-ACNC: < 5 MIU/ML — SIGNIFICANT CHANGE UP
HCT VFR BLD CALC: 45 % — SIGNIFICANT CHANGE UP (ref 34.5–45)
HGB BLD-MCNC: 14.3 G/DL — SIGNIFICANT CHANGE UP (ref 11.5–15.5)
MCHC RBC-ENTMCNC: 28.3 PG — SIGNIFICANT CHANGE UP (ref 27–34)
MCHC RBC-ENTMCNC: 31.8 % — LOW (ref 32–36)
MCV RBC AUTO: 89.1 FL — SIGNIFICANT CHANGE UP (ref 80–100)
NRBC # FLD: 0 K/UL — SIGNIFICANT CHANGE UP (ref 0–0)
PLATELET # BLD AUTO: 219 K/UL — SIGNIFICANT CHANGE UP (ref 150–400)
PMV BLD: 10.3 FL — SIGNIFICANT CHANGE UP (ref 7–13)
POTASSIUM SERPL-MCNC: 3.7 MMOL/L — SIGNIFICANT CHANGE UP (ref 3.5–5.3)
POTASSIUM SERPL-SCNC: 3.7 MMOL/L — SIGNIFICANT CHANGE UP (ref 3.5–5.3)
RBC # BLD: 5.05 M/UL — SIGNIFICANT CHANGE UP (ref 3.8–5.2)
RBC # FLD: 13.8 % — SIGNIFICANT CHANGE UP (ref 10.3–14.5)
RH IG SCN BLD-IMP: POSITIVE — SIGNIFICANT CHANGE UP
SODIUM SERPL-SCNC: 144 MMOL/L — SIGNIFICANT CHANGE UP (ref 135–145)
WBC # BLD: 8.13 K/UL — SIGNIFICANT CHANGE UP (ref 3.8–10.5)
WBC # FLD AUTO: 8.13 K/UL — SIGNIFICANT CHANGE UP (ref 3.8–10.5)

## 2019-08-22 PROCEDURE — 99214 OFFICE O/P EST MOD 30 MIN: CPT

## 2019-08-22 NOTE — H&P PST ADULT - RS GEN PE MLT RESP DETAILS PC
respirations non-labored/breath sounds equal/good air movement/clear to auscultation bilaterally/airway patent respirations non-labored/breath sounds equal/no chest wall tenderness/no rhonchi/clear to auscultation bilaterally/no wheezes/no rales/no intercostal retractions/good air movement

## 2019-08-22 NOTE — H&P PST ADULT - MUSCULOSKELETAL
details… detailed exam no joint swelling/no joint erythema/normal strength/no calf tenderness/no joint warmth/ROM intact normal strength/ROM intact

## 2019-08-22 NOTE — H&P PST ADULT - NSICDXPASTSURGICALHX_GEN_ALL_CORE_FT
PAST SURGICAL HISTORY:  H/O abdominoplasty     H/O:  , ,     S/P cholecystectomy  PAST SURGICAL HISTORY:  H/O abdominoplasty     H/O:  2000, 2004, 2006    S/P cholecystectomy     S/P tonsillectomy

## 2019-08-22 NOTE — H&P PST ADULT - TERM DELIVERIES, OB PROFILE
ENT PROGRESS NOTE    Chief Complaint   Patient presents with   • Neurologic Problem     [facility]     HPI:  Ceasar Spain is a 66 year old male seen in the [facility] today at request of Tg Becerra, MEGHAN for further assessment of dysarthria. The patient was first seen in [facility] in January 2019. The patient said it seems like his tongue does not want to cooperate. He said that it seems like more and more people are unable to understand him. He reported that symptoms remain relatively unchanged for the last couple years. He stated that symptoms started after an episode of acute illness and emesis years ago. He denied history of pneumonia in the past. The patient was found to have tongue weakness right greater than left, and fasciculations / tremor. Videostroboscopy (01/08/19) showed normal vocal cord movement bilaterally. There was a small, benign appearing cyst behind the right arytenoid. Patient had decreased sensation on the right. FEES showed some penetration of thin liquids, no overt aspiration. We recommended the patient turn head to right when swallowing. His symptoms appeared to be secondary to an upper motor neuron issue in the brainstem. Recommended reviewing prior imaging and follow-up in 6 months to reevaluate cyst. The patient returns to clinic today for follow-up. 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     The patient does not have any major complaints today. He says that his speaking and swallowing has been stable.     HISTORIES:    Past Medical History:   Diagnosis Date   • Arthritis    • Atrial fibrillation (CMS/HCC) 12/2016   • Cardiomyopathy (CMS/HCC)    • Chest pain 07/11/2005   • Dyslipidemia    • Hypertension    • Lung nodule     monitoring   • Obstructive sleep apnea     cpap   • Pneumonia     2016   • Pulmonary HTN  (CMS/[facility])    • Urinary tract infection 2016; tx'd   • Wears reading eyeglasses      Past Surgical History:   Procedure Laterality Date   • Hernia repair      umbilical   • Ir central line Right 2016    GARCIA   • Mri cardiac for myocardial stress and rest perfusion with viability  19   • Right heart cath  2017   • Right heart cath  2018   • Right heart cath  02/15/2019   • Right heart cath w oc sat & cardiac output  3/9/2016     Social History     Socioeconomic History   • Marital status: /Civil Union     Spouse name: Not on file   • Number of children: Not on file   • Years of education: Not on file   • Highest education level: Not on file   Occupational History   • Not on file   Social Needs   • Financial resource strain: Not on file   • Food insecurity:     Worry: Not on file     Inability: Not on file   • Transportation needs:     Medical: Not on file     Non-medical: Not on file   Tobacco Use   • Smoking status: Former Smoker     Packs/day: 0.50     Years: 5.00     Pack years: 2.50     Types: Cigarettes     Last attempt to quit: 3/9/1973     Years since quittin.2   • Smokeless tobacco: Never Used   Substance and Sexual Activity   • Alcohol use: Yes     Alcohol/week: 0.0 - 1.8 oz     Comment: 3 a week   • Drug use: No   • Sexual activity: Not on file   Lifestyle   • Physical activity:     Days per week: Not on file     Minutes per session: Not on file   • Stress: Not on file   Relationships   • Social connections:     Talks on phone: Not on file     Gets together: Not on file     Attends Hindu service: Not on file     Active member of club or organization: Not on file     Attends meetings of clubs or organizations: Not on file     Relationship status: Not on file   • Intimate partner violence:     Fear of current or ex partner: Not on file     Emotionally abused: Not on file     Physically abused: Not on file     Forced sexual activity: Not on file   Other Topics Concern    • Not on file   Social History Narrative   • Not on file     Family History   Problem Relation Age of Onset   • Arthritis Mother    • Heart disease Father    • COPD Brother      ALLERGIES:  No Known Allergies    REVIEW OF SYSTEMS:    12 point review of systems completed and all negative, except as noted in HPI.    PHYSICAL EXAM:    Visit Vitals  /76 (BP Location: [facility], Patient Position: Sitting)   Pulse 73   Ht 5' 10\" (1.778 m)   Wt 113.4 kg   BMI 35.87 kg/m²      General: Well-developed, well-nourished male. In no acute distress.  Skin: Warm with normal turgor  Head: Atraumatic and normocephalic  Face: Normal appearance. Facial movement symmetric.  Eyes: Eyelids and conjunctiva appear normal, no excessive tearing or discharge. Ocular mobility is normal.  Ears:  Assessment of hearing with conversational voice normal. External inspection of the ears normal.  Nose: Septum midline. Turbinates normal. Mucosal color normal. No polyps seen.  Oral Cavity:  Tongue weaker on right. Fasciculations bilaterally.  Oropharynx: Hard and soft palate with tremor.  No masses.   Neck: Supple, no thyromegaly.    Neurologic:  The patient is oriented to person, place, and time. Recent and remote memory functions are normal. The person is attentive with normal concentration. Language is fluent. Speech is normal.  Respiratory:  Symmetric chest movement, no excessive respiratory effort, no use of accessory muscles.    Flexible Laryngoscopy: See procedure noted    IMAGING STUDIES:      MRI Brain W WO Contrast 03/14/19   IMPRESSION:    Unchanged contrast blush dorsal stepan inferiorly and medulla. 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    IMPRESSION:   66 year old male with dysarthria, tongue weakness right greater than left, and fasciculations. 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 The  patients voice and swallowing has been stable. He does have a right arytenoid cyst that has increased in size and is more purple appearing than prior. Recommend direct microlaryngoscopy and removal of right arytenoid lesion with CO2 laser. The risks of surgery, including but not limited to, bleeding, infection, and injury to mouth.     PLAN:   1. Schedule patient for direct microlaryngoscopy and removal of right arytenoid lesion   2. Tentatively plan to repeat MRI in 6 months     JANI Singh    On 5/14/2019, I, JANI Singh scribed the services personally performed by Ren Grover MD    Consultant Note:    The documentation recorded by the scribe accurately and completely reflects the service(s) I personally performed and the decisions made by me. I separate note will be dictated on the laryngoscopy.    Sincerely,    Ren Grover MD  Otolaryngology - Head and Neck Surgery  Rhinology and Endoscopic Skull base Surgery     3

## 2019-08-22 NOTE — H&P PST ADULT - NSICDXPASTMEDICALHX_GEN_ALL_CORE_FT
PAST MEDICAL HISTORY:  Acid reflux     Breast lump in female PAST MEDICAL HISTORY:  Acid reflux     Breast lump in female     Malignant neoplasm of breast

## 2019-08-22 NOTE — H&P PST ADULT - NEGATIVE GENERAL SYMPTOMS
no chills/no sweating/no anorexia/no weight gain/no polyphagia/no polyuria/no polydipsia/no weight loss/no fever/no malaise/no fatigue

## 2019-08-22 NOTE — H&P PST ADULT - GASTROINTESTINAL DETAILS
soft/nontender/no distention no rigidity/no organomegaly/no distention/bowel sounds normal/no guarding/no bruit/no rebound tenderness/soft/nontender

## 2019-08-22 NOTE — H&P PST ADULT - NEGATIVE GENERAL GENITOURINARY SYMPTOMS
no flank pain R/no incontinence/no dysuria/no bladder infections/normal urinary frequency/no hematuria/no renal colic/no flank pain L

## 2019-08-22 NOTE — H&P PST ADULT - HISTORY OF PRESENT ILLNESS
55y/o female scheduled for bilateral mastectomies with tissue expanders on 2019.  Pt states, "dx with breast cancer, underwent right lumpectomy, bilateral breast reduction with right axillary node dissection on 5/3/2019  denies chemo and radiation.  Mother  of breast cancer, decided to have bilateral mastectomy.

## 2019-08-22 NOTE — H&P PST ADULT - NSICDXPROBLEM_GEN_ALL_CORE_FT
PROBLEM DIAGNOSES  Problem: Malignant neoplasm of breast  Assessment and Plan: Pt scheduled for bilateral mastectomies with tissue expanders on 9/11/2019.  labs done results pending.  Urine cup provided.  Hibiclens provided:  verbal and written instruction provided, with teach back  Pt able to verbalize understanding.  Preop teaching done, pt able to verbalize understanding.

## 2019-08-22 NOTE — H&P PST ADULT - NEGATIVE CARDIOVASCULAR SYMPTOMS
no peripheral edema/no orthopnea/no paroxysmal nocturnal dyspnea/no claudication/no palpitations/no dyspnea on exertion/no chest pain

## 2019-08-27 PROBLEM — C50.919 MALIGNANT NEOPLASM OF UNSPECIFIED SITE OF UNSPECIFIED FEMALE BREAST: Chronic | Status: ACTIVE | Noted: 2019-08-22

## 2019-09-10 ENCOUNTER — TRANSCRIPTION ENCOUNTER (OUTPATIENT)
Age: 54
End: 2019-09-10

## 2019-09-11 ENCOUNTER — INPATIENT (INPATIENT)
Facility: HOSPITAL | Age: 54
LOS: 1 days | Discharge: HOME | End: 2019-09-13
Attending: SURGERY | Admitting: SURGERY
Payer: COMMERCIAL

## 2019-09-11 ENCOUNTER — APPOINTMENT (OUTPATIENT)
Dept: SURGICAL ONCOLOGY | Facility: HOSPITAL | Age: 54
End: 2019-09-11

## 2019-09-11 ENCOUNTER — RESULT REVIEW (OUTPATIENT)
Age: 54
End: 2019-09-11

## 2019-09-11 VITALS
RESPIRATION RATE: 16 BRPM | HEIGHT: 63 IN | WEIGHT: 192.9 LBS | TEMPERATURE: 98 F | OXYGEN SATURATION: 97 % | HEART RATE: 84 BPM | SYSTOLIC BLOOD PRESSURE: 120 MMHG | DIASTOLIC BLOOD PRESSURE: 85 MMHG

## 2019-09-11 DIAGNOSIS — D05.01 LOBULAR CARCINOMA IN SITU OF RIGHT BREAST: ICD-10-CM

## 2019-09-11 DIAGNOSIS — Z90.89 ACQUIRED ABSENCE OF OTHER ORGANS: Chronic | ICD-10-CM

## 2019-09-11 DIAGNOSIS — Z98.891 HISTORY OF UTERINE SCAR FROM PREVIOUS SURGERY: Chronic | ICD-10-CM

## 2019-09-11 DIAGNOSIS — Z98.890 OTHER SPECIFIED POSTPROCEDURAL STATES: Chronic | ICD-10-CM

## 2019-09-11 DIAGNOSIS — Z90.49 ACQUIRED ABSENCE OF OTHER SPECIFIED PARTS OF DIGESTIVE TRACT: Chronic | ICD-10-CM

## 2019-09-11 LAB
HCG UR QL: NEGATIVE — SIGNIFICANT CHANGE UP
RH IG SCN BLD-IMP: POSITIVE — SIGNIFICANT CHANGE UP

## 2019-09-11 PROCEDURE — 88309 TISSUE EXAM BY PATHOLOGIST: CPT | Mod: 26

## 2019-09-11 PROCEDURE — 19303 MAST SIMPLE COMPLETE: CPT | Mod: 50

## 2019-09-11 PROCEDURE — 19357 TISS XPNDR PLMT BRST RCNSTJ: CPT | Mod: RT

## 2019-09-11 PROCEDURE — 15734 MUSCLE-SKIN GRAFT TRUNK: CPT | Mod: 59

## 2019-09-11 PROCEDURE — 88305 TISSUE EXAM BY PATHOLOGIST: CPT | Mod: 26

## 2019-09-11 PROCEDURE — 88331 PATH CONSLTJ SURG 1 BLK 1SPC: CPT | Mod: 26

## 2019-09-11 PROCEDURE — 19303 MAST SIMPLE COMPLETE: CPT | Mod: 82,RT

## 2019-09-11 RX ORDER — FENTANYL CITRATE 50 UG/ML
50 INJECTION INTRAVENOUS
Refills: 0 | Status: DISCONTINUED | OUTPATIENT
Start: 2019-09-11 | End: 2019-09-12

## 2019-09-11 RX ORDER — IBUPROFEN 200 MG
400 TABLET ORAL EVERY 6 HOURS
Refills: 0 | Status: DISCONTINUED | OUTPATIENT
Start: 2019-09-11 | End: 2019-09-13

## 2019-09-11 RX ORDER — HYDROMORPHONE HYDROCHLORIDE 2 MG/ML
4 INJECTION INTRAMUSCULAR; INTRAVENOUS; SUBCUTANEOUS EVERY 4 HOURS
Refills: 0 | Status: DISCONTINUED | OUTPATIENT
Start: 2019-09-11 | End: 2019-09-13

## 2019-09-11 RX ORDER — ONDANSETRON 8 MG/1
4 TABLET, FILM COATED ORAL ONCE
Refills: 0 | Status: COMPLETED | OUTPATIENT
Start: 2019-09-11 | End: 2019-09-11

## 2019-09-11 RX ORDER — INFLUENZA VIRUS VACCINE 15; 15; 15; 15 UG/.5ML; UG/.5ML; UG/.5ML; UG/.5ML
0.5 SUSPENSION INTRAMUSCULAR ONCE
Refills: 0 | Status: COMPLETED | OUTPATIENT
Start: 2019-09-11 | End: 2019-09-11

## 2019-09-11 RX ORDER — SODIUM CHLORIDE 9 MG/ML
1000 INJECTION, SOLUTION INTRAVENOUS
Refills: 0 | Status: DISCONTINUED | OUTPATIENT
Start: 2019-09-11 | End: 2019-09-11

## 2019-09-11 RX ORDER — SODIUM CHLORIDE 9 MG/ML
1000 INJECTION INTRAMUSCULAR; INTRAVENOUS; SUBCUTANEOUS
Refills: 0 | Status: DISCONTINUED | OUTPATIENT
Start: 2019-09-11 | End: 2019-09-11

## 2019-09-11 RX ORDER — METOCLOPRAMIDE HCL 10 MG
5 TABLET ORAL EVERY 8 HOURS
Refills: 0 | Status: DISCONTINUED | OUTPATIENT
Start: 2019-09-11 | End: 2019-09-13

## 2019-09-11 RX ORDER — ACETAMINOPHEN 500 MG
1000 TABLET ORAL EVERY 6 HOURS
Refills: 0 | Status: DISCONTINUED | OUTPATIENT
Start: 2019-09-11 | End: 2019-09-13

## 2019-09-11 RX ORDER — ENOXAPARIN SODIUM 100 MG/ML
40 INJECTION SUBCUTANEOUS DAILY
Refills: 0 | Status: DISCONTINUED | OUTPATIENT
Start: 2019-09-11 | End: 2019-09-13

## 2019-09-11 RX ORDER — HYDROMORPHONE HYDROCHLORIDE 2 MG/ML
2 INJECTION INTRAMUSCULAR; INTRAVENOUS; SUBCUTANEOUS EVERY 4 HOURS
Refills: 0 | Status: DISCONTINUED | OUTPATIENT
Start: 2019-09-11 | End: 2019-09-13

## 2019-09-11 RX ORDER — SODIUM CHLORIDE 9 MG/ML
3 INJECTION INTRAMUSCULAR; INTRAVENOUS; SUBCUTANEOUS EVERY 8 HOURS
Refills: 0 | Status: DISCONTINUED | OUTPATIENT
Start: 2019-09-11 | End: 2019-09-13

## 2019-09-11 RX ADMIN — Medication 100 MILLIGRAM(S): at 16:05

## 2019-09-11 RX ADMIN — Medication 400 MILLIGRAM(S): at 17:51

## 2019-09-11 RX ADMIN — SODIUM CHLORIDE 100 MILLILITER(S): 9 INJECTION INTRAMUSCULAR; INTRAVENOUS; SUBCUTANEOUS at 14:28

## 2019-09-11 RX ADMIN — SODIUM CHLORIDE 3 MILLILITER(S): 9 INJECTION INTRAMUSCULAR; INTRAVENOUS; SUBCUTANEOUS at 22:11

## 2019-09-11 RX ADMIN — HYDROMORPHONE HYDROCHLORIDE 2 MILLIGRAM(S): 2 INJECTION INTRAMUSCULAR; INTRAVENOUS; SUBCUTANEOUS at 16:05

## 2019-09-11 RX ADMIN — HYDROMORPHONE HYDROCHLORIDE 2 MILLIGRAM(S): 2 INJECTION INTRAMUSCULAR; INTRAVENOUS; SUBCUTANEOUS at 16:35

## 2019-09-11 RX ADMIN — Medication 400 MILLIGRAM(S): at 18:21

## 2019-09-11 RX ADMIN — Medication 400 MILLIGRAM(S): at 22:09

## 2019-09-11 RX ADMIN — ENOXAPARIN SODIUM 40 MILLIGRAM(S): 100 INJECTION SUBCUTANEOUS at 17:52

## 2019-09-11 RX ADMIN — ONDANSETRON 4 MILLIGRAM(S): 8 TABLET, FILM COATED ORAL at 13:10

## 2019-09-11 RX ADMIN — SODIUM CHLORIDE 100 MILLILITER(S): 9 INJECTION INTRAMUSCULAR; INTRAVENOUS; SUBCUTANEOUS at 17:52

## 2019-09-11 NOTE — CHART NOTE - NSCHARTNOTEFT_GEN_A_CORE
STATUS POST:  bilateral nipple sparing mastectomies with tissue expander reconstruction    POST OPERATIVE DAY #: 0    SUBJECTIVE: Pt seen at [facility], complains of mild breast pain. tolerating clear liquids. denies N/V SOB and chest pain      Vital Signs Last 24 Hrs  T(C): 37.1 (11 Sep 2019 13:30), Max: 37.2 (11 Sep 2019 12:25)  T(F): 98.8 (11 Sep 2019 13:30), Max: 99 (11 Sep 2019 12:25)  HR: 91 (11 Sep 2019 14:45) (79 - 93)  BP: 126/83 (11 Sep 2019 14:45) (114/71 - 130/79)  BP(mean): 91 (11 Sep 2019 14:45) (75 - 91)  RR: 16 (11 Sep 2019 14:45) (12 - 20)  SpO2: 99% (11 Sep 2019 14:45) (93% - 99%)  I&O's Summary    11 Sep 2019 07:01  -  11 Sep 2019 18:21  --------------------------------------------------------  IN: 500 mL / OUT: 735 mL / NET: -235 mL      I&O's Detail    11 Sep 2019 07:01  -  11 Sep 2019 18:21  --------------------------------------------------------  IN:    Oral Fluid: 200 mL    sodium chloride 0.9%.: 300 mL  Total IN: 500 mL    OUT:    Bulb: 65 mL    Bulb: 70 mL    Voided: 600 mL  Total OUT: 735 mL    Total NET: -235 mL          MEDICATIONS  (STANDING):  clindamycin IVPB 600 milliGRAM(s) IV Intermittent every 8 hours  enoxaparin Injectable 40 milliGRAM(s) SubCutaneous daily  ibuprofen  Tablet. 400 milliGRAM(s) Oral every 6 hours  sodium chloride 0.9%. 1000 milliLiter(s) (100 mL/Hr) IV Continuous <Continuous>    MEDICATIONS  (PRN):  fentaNYL    Injectable 50 MICROGram(s) IV Push every 10 minutes PRN Severe Pain (7 - 10)  HYDROmorphone   Tablet 2 milliGRAM(s) Oral every 4 hours PRN Moderate Pain (4 - 6)  HYDROmorphone   Tablet 4 milliGRAM(s) Oral every 4 hours PRN Severe Pain (7 - 10)  metoclopramide 5 milliGRAM(s) Oral every 8 hours PRN nausea      LABS:                RADIOLOGY & ADDITIONAL STUDIES:    PHYSICAL EXAM:      Constitutional: AOx3, NAD    Eyes: non icteric    Breasts: Non tender to palpation, non erythematous, no nipple discharge, inferior incisions intact with no signs of active bleeding, dressing c/d/i     Respiratory: Breathing comfortably     Gastrointestinal: abdomen soft, non tender, non distended, no rebound guarding or signs of peritonitis     Extremities: FROM    Neurological: No focal deficits          A/P: 54y Female s/p   - Diet: regular diet   - Activity: ambulate as tolerated  - Labs: f/u AM labs  - Pain medication  - DVT ppx STATUS POST:  bilateral nipple sparing mastectomies with tissue expander reconstruction    POST OPERATIVE DAY #: 0    SUBJECTIVE: Pt seen at [facility], complains of mild breast pain. tolerating clear liquids. denies N/V SOB and chest pain      Vital Signs Last 24 Hrs  T(C): 37.1 (11 Sep 2019 13:30), Max: 37.2 (11 Sep 2019 12:25)  T(F): 98.8 (11 Sep 2019 13:30), Max: 99 (11 Sep 2019 12:25)  HR: 91 (11 Sep 2019 14:45) (79 - 93)  BP: 126/83 (11 Sep 2019 14:45) (114/71 - 130/79)  BP(mean): 91 (11 Sep 2019 14:45) (75 - 91)  RR: 16 (11 Sep 2019 14:45) (12 - 20)  SpO2: 99% (11 Sep 2019 14:45) (93% - 99%)  I&O's Summary    11 Sep 2019 07:01  -  11 Sep 2019 18:21  --------------------------------------------------------  IN: 500 mL / OUT: 735 mL / NET: -235 mL      I&O's Detail    11 Sep 2019 07:01  -  11 Sep 2019 18:21  --------------------------------------------------------  IN:    Oral Fluid: 200 mL    sodium chloride 0.9%.: 300 mL  Total IN: 500 mL    OUT:    Bulb: 65 mL    Bulb: 70 mL    Voided: 600 mL  Total OUT: 735 mL    Total NET: -235 mL          MEDICATIONS  (STANDING):  clindamycin IVPB 600 milliGRAM(s) IV Intermittent every 8 hours  enoxaparin Injectable 40 milliGRAM(s) SubCutaneous daily  ibuprofen  Tablet. 400 milliGRAM(s) Oral every 6 hours  sodium chloride 0.9%. 1000 milliLiter(s) (100 mL/Hr) IV Continuous <Continuous>    MEDICATIONS  (PRN):  fentaNYL    Injectable 50 MICROGram(s) IV Push every 10 minutes PRN Severe Pain (7 - 10)  HYDROmorphone   Tablet 2 milliGRAM(s) Oral every 4 hours PRN Moderate Pain (4 - 6)  HYDROmorphone   Tablet 4 milliGRAM(s) Oral every 4 hours PRN Severe Pain (7 - 10)  metoclopramide 5 milliGRAM(s) Oral every 8 hours PRN nausea      LABS:                RADIOLOGY & ADDITIONAL STUDIES:    PHYSICAL EXAM:      Constitutional: AOx3, NAD    Eyes: non icteric    Breasts: Non tender to palpation, non erythematous, no nipple discharge, inferior incisions intact with no signs of active bleeding, dressing c/d/i. b/l RHIANNA drains serosanguinous    Respiratory: Breathing comfortably     Gastrointestinal: abdomen soft, non tender, non distended, no rebound guarding or signs of peritonitis     Extremities: FROM    Neurological: No focal deficits          A/P: 54y Female s/p   - Diet: regular diet   - Activity: ambulate as tolerated  - Labs: f/u AM labs  - Pain medication  - DVT ppx

## 2019-09-11 NOTE — BRIEF OPERATIVE NOTE - NSICDXBRIEFPREOP_GEN_ALL_CORE_FT
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

## 2019-09-11 NOTE — BRIEF OPERATIVE NOTE - OPERATION/FINDINGS
Bilateral nipple sparing mastectomies was performed through an IMF incision.  After the mastectomy the operative field was turned over to Dr Valentin for reconstruction.
symmastia with communication of surgically created defects during mastectomy. reconstruction with Alfredo

## 2019-09-12 ENCOUNTER — TRANSCRIPTION ENCOUNTER (OUTPATIENT)
Age: 54
End: 2019-09-12

## 2019-09-12 LAB
ANION GAP SERPL CALC-SCNC: 14 MMO/L — SIGNIFICANT CHANGE UP (ref 7–14)
BUN SERPL-MCNC: 13 MG/DL — SIGNIFICANT CHANGE UP (ref 7–23)
CALCIUM SERPL-MCNC: 8.3 MG/DL — LOW (ref 8.4–10.5)
CHLORIDE SERPL-SCNC: 104 MMOL/L — SIGNIFICANT CHANGE UP (ref 98–107)
CO2 SERPL-SCNC: 24 MMOL/L — SIGNIFICANT CHANGE UP (ref 22–31)
CREAT SERPL-MCNC: 0.54 MG/DL — SIGNIFICANT CHANGE UP (ref 0.5–1.3)
GLUCOSE SERPL-MCNC: 117 MG/DL — HIGH (ref 70–99)
HCT VFR BLD CALC: 35.2 % — SIGNIFICANT CHANGE UP (ref 34.5–45)
HGB BLD-MCNC: 11.3 G/DL — LOW (ref 11.5–15.5)
MAGNESIUM SERPL-MCNC: 1.8 MG/DL — SIGNIFICANT CHANGE UP (ref 1.6–2.6)
MCHC RBC-ENTMCNC: 28.7 PG — SIGNIFICANT CHANGE UP (ref 27–34)
MCHC RBC-ENTMCNC: 32.1 % — SIGNIFICANT CHANGE UP (ref 32–36)
MCV RBC AUTO: 89.3 FL — SIGNIFICANT CHANGE UP (ref 80–100)
NRBC # FLD: 0 K/UL — SIGNIFICANT CHANGE UP (ref 0–0)
PHOSPHATE SERPL-MCNC: 4.1 MG/DL — SIGNIFICANT CHANGE UP (ref 2.5–4.5)
PLATELET # BLD AUTO: 259 K/UL — SIGNIFICANT CHANGE UP (ref 150–400)
PMV BLD: 10 FL — SIGNIFICANT CHANGE UP (ref 7–13)
POTASSIUM SERPL-MCNC: 3.7 MMOL/L — SIGNIFICANT CHANGE UP (ref 3.5–5.3)
POTASSIUM SERPL-SCNC: 3.7 MMOL/L — SIGNIFICANT CHANGE UP (ref 3.5–5.3)
RBC # BLD: 3.94 M/UL — SIGNIFICANT CHANGE UP (ref 3.8–5.2)
RBC # FLD: 14.4 % — SIGNIFICANT CHANGE UP (ref 10.3–14.5)
SODIUM SERPL-SCNC: 142 MMOL/L — SIGNIFICANT CHANGE UP (ref 135–145)
WBC # BLD: 11.62 K/UL — HIGH (ref 3.8–10.5)
WBC # FLD AUTO: 11.62 K/UL — HIGH (ref 3.8–10.5)

## 2019-09-12 PROCEDURE — 93010 ELECTROCARDIOGRAM REPORT: CPT

## 2019-09-12 RX ORDER — IBUPROFEN 200 MG
1 TABLET ORAL
Qty: 0 | Refills: 0 | DISCHARGE
Start: 2019-09-12

## 2019-09-12 RX ORDER — DOCUSATE SODIUM 100 MG
100 CAPSULE ORAL THREE TIMES A DAY
Refills: 0 | Status: DISCONTINUED | OUTPATIENT
Start: 2019-09-12 | End: 2019-09-13

## 2019-09-12 RX ORDER — DEXTROSE MONOHYDRATE, SODIUM CHLORIDE, AND POTASSIUM CHLORIDE 50; .745; 4.5 G/1000ML; G/1000ML; G/1000ML
1000 INJECTION, SOLUTION INTRAVENOUS
Refills: 0 | Status: DISCONTINUED | OUTPATIENT
Start: 2019-09-12 | End: 2019-09-13

## 2019-09-12 RX ORDER — POTASSIUM CHLORIDE 20 MEQ
40 PACKET (EA) ORAL ONCE
Refills: 0 | Status: COMPLETED | OUTPATIENT
Start: 2019-09-12 | End: 2019-09-12

## 2019-09-12 RX ORDER — HYDROMORPHONE HYDROCHLORIDE 2 MG/ML
1 INJECTION INTRAMUSCULAR; INTRAVENOUS; SUBCUTANEOUS
Qty: 0 | Refills: 0 | DISCHARGE
Start: 2019-09-12

## 2019-09-12 RX ORDER — MAGNESIUM SULFATE 500 MG/ML
2 VIAL (ML) INJECTION ONCE
Refills: 0 | Status: COMPLETED | OUTPATIENT
Start: 2019-09-12 | End: 2019-09-12

## 2019-09-12 RX ADMIN — HYDROMORPHONE HYDROCHLORIDE 2 MILLIGRAM(S): 2 INJECTION INTRAMUSCULAR; INTRAVENOUS; SUBCUTANEOUS at 10:20

## 2019-09-12 RX ADMIN — Medication 1000 MILLIGRAM(S): at 00:00

## 2019-09-12 RX ADMIN — SODIUM CHLORIDE 3 MILLILITER(S): 9 INJECTION INTRAMUSCULAR; INTRAVENOUS; SUBCUTANEOUS at 21:22

## 2019-09-12 RX ADMIN — Medication 400 MILLIGRAM(S): at 05:45

## 2019-09-12 RX ADMIN — HYDROMORPHONE HYDROCHLORIDE 4 MILLIGRAM(S): 2 INJECTION INTRAMUSCULAR; INTRAVENOUS; SUBCUTANEOUS at 21:25

## 2019-09-12 RX ADMIN — Medication 100 MILLIGRAM(S): at 00:04

## 2019-09-12 RX ADMIN — Medication 50 GRAM(S): at 09:32

## 2019-09-12 RX ADMIN — Medication 100 MILLIGRAM(S): at 21:25

## 2019-09-12 RX ADMIN — Medication 400 MILLIGRAM(S): at 16:45

## 2019-09-12 RX ADMIN — SODIUM CHLORIDE 3 MILLILITER(S): 9 INJECTION INTRAMUSCULAR; INTRAVENOUS; SUBCUTANEOUS at 14:08

## 2019-09-12 RX ADMIN — Medication 1000 MILLIGRAM(S): at 17:21

## 2019-09-12 RX ADMIN — ENOXAPARIN SODIUM 40 MILLIGRAM(S): 100 INJECTION SUBCUTANEOUS at 12:13

## 2019-09-12 RX ADMIN — Medication 400 MILLIGRAM(S): at 12:14

## 2019-09-12 RX ADMIN — DEXTROSE MONOHYDRATE, SODIUM CHLORIDE, AND POTASSIUM CHLORIDE 50 MILLILITER(S): 50; .745; 4.5 INJECTION, SOLUTION INTRAVENOUS at 16:44

## 2019-09-12 RX ADMIN — Medication 400 MILLIGRAM(S): at 00:04

## 2019-09-12 RX ADMIN — HYDROMORPHONE HYDROCHLORIDE 4 MILLIGRAM(S): 2 INJECTION INTRAMUSCULAR; INTRAVENOUS; SUBCUTANEOUS at 22:25

## 2019-09-12 RX ADMIN — Medication 400 MILLIGRAM(S): at 01:00

## 2019-09-12 RX ADMIN — SODIUM CHLORIDE 3 MILLILITER(S): 9 INJECTION INTRAMUSCULAR; INTRAVENOUS; SUBCUTANEOUS at 05:44

## 2019-09-12 RX ADMIN — DEXTROSE MONOHYDRATE, SODIUM CHLORIDE, AND POTASSIUM CHLORIDE 50 MILLILITER(S): 50; .745; 4.5 INJECTION, SOLUTION INTRAVENOUS at 20:31

## 2019-09-12 RX ADMIN — HYDROMORPHONE HYDROCHLORIDE 2 MILLIGRAM(S): 2 INJECTION INTRAMUSCULAR; INTRAVENOUS; SUBCUTANEOUS at 09:32

## 2019-09-12 RX ADMIN — Medication 400 MILLIGRAM(S): at 17:24

## 2019-09-12 RX ADMIN — Medication 100 MILLIGRAM(S): at 09:32

## 2019-09-12 RX ADMIN — Medication 100 MILLIGRAM(S): at 16:44

## 2019-09-12 RX ADMIN — Medication 40 MILLIEQUIVALENT(S): at 09:32

## 2019-09-12 NOTE — DISCHARGE NOTE PROVIDER - HOSPITAL COURSE
53y/o female scheduled for bilateral mastectomies with tissue expanders on 2019.  Pt states, "dx with breast cancer, underwent right lumpectomy, bilateral breast reduction with right axillary node dissection on 5/3/2019 denies chemo and radiation.  Mother  of breast cancer, decided to have bilateral mastectomy.        Patient is status post nipple sparing mastectomies and tissue expanders.        Post operatively, patient did well.  Pain is well controlled.  She is voiding and ambulating without difficulty.        RHIANNA teaching was provided and she is will be discharged home with drains.        Patient is stable for discharge home and will follow up with Dr Adames and Dr Valentin in one week. 53y/o female scheduled for bilateral mastectomies with tissue expanders on 2019.  Pt states, "dx with breast cancer, underwent right lumpectomy, bilateral breast reduction with right axillary node dissection on 5/3/2019 denies chemo and radiation.  Mother  of breast cancer, decided to have bilateral mastectomy.        Patient is status post nipple sparing mastectomies and tissue expanders.        Post operatively, patient did well.  Pain is well controlled.  She is voiding and ambulating without difficulty.        RHIANNA teaching was provided and she is will be discharged home with drains.        Patient is stable for discharge home and will follow up with Dr Adames and Dr Valentin in one week.         Patient provided with Rx for Dilaudid and Bactrim already.

## 2019-09-12 NOTE — PROGRESS NOTE ADULT - SUBJECTIVE AND OBJECTIVE BOX
D Team Surgery    SUBJECTIVE: Pt seen and examined at bedside.  Pt doing well.  Pain controlled.  Tolerating diet.  No acute complaints at this time.      OBJECTIVE: T(C): 36.8 (09-12-19 @ 09:54), Max: 37.2 (09-11-19 @ 12:25)  HR: 86 (09-12-19 @ 09:54) (70 - 93)  BP: 109/52 (09-12-19 @ 09:54) (92/59 - 130/79)  RR: 16 (09-12-19 @ 09:54) (12 - 169)  SpO2: 95% (09-12-19 @ 09:54) (93% - 100%)  Wt(kg): --  I&O's Summary    11 Sep 2019 07:01  -  12 Sep 2019 07:00  --------------------------------------------------------  IN: 950 mL / OUT: 2555 mL / NET: -1605 mL    12 Sep 2019 07:01  -  12 Sep 2019 11:09  --------------------------------------------------------  IN: 200 mL / OUT: 777 mL / NET: -577 mL      I&O's Detail    11 Sep 2019 07:01  -  12 Sep 2019 07:00  --------------------------------------------------------  IN:    IV PiggyBack: 150 mL    Oral Fluid: 200 mL    sodium chloride 0.9%: 600 mL  Total IN: 950 mL    OUT:    Bulb: 190 mL    Bulb: 165 mL    Voided: 2200 mL  Total OUT: 2555 mL    Total NET: -1605 mL      12 Sep 2019 07:01  -  12 Sep 2019 11:09  --------------------------------------------------------  IN:    Oral Fluid: 200 mL  Total IN: 200 mL    OUT:    Bulb: 42 mL    Bulb: 35 mL    Voided: 700 mL  Total OUT: 777 mL    Total NET: -577 mL        General: NAD  Chest: incisions c/d/i, JPx2 serosang, no signs of hematoma    MEDICATIONS  (STANDING):  clindamycin IVPB 600 milliGRAM(s) IV Intermittent every 8 hours  enoxaparin Injectable 40 milliGRAM(s) SubCutaneous daily  ibuprofen  Tablet. 400 milliGRAM(s) Oral every 6 hours  influenza   Vaccine 0.5 milliLiter(s) IntraMuscular once  sodium chloride 0.9% lock flush 3 milliLiter(s) IV Push every 8 hours    MEDICATIONS  (PRN):  acetaminophen  IVPB .. 1000 milliGRAM(s) IV Intermittent every 6 hours PRN Mild Pain (1 - 3)  fentaNYL    Injectable 50 MICROGram(s) IV Push every 10 minutes PRN Severe Pain (7 - 10)  HYDROmorphone   Tablet 2 milliGRAM(s) Oral every 4 hours PRN Moderate Pain (4 - 6)  HYDROmorphone   Tablet 4 milliGRAM(s) Oral every 4 hours PRN Severe Pain (7 - 10)  metoclopramide 5 milliGRAM(s) Oral every 8 hours PRN nausea      LABS:                        11.3   11.62 )-----------( 259      ( 12 Sep 2019 05:30 )             35.2     09-12    142  |  104  |  13  ----------------------------<  117<H>  3.7   |  24  |  0.54    Ca    8.3<L>      12 Sep 2019 05:30  Phos  4.1     09-12  Mg     1.8     09-12            RADIOLOGY & ADDITIONAL STUDIES:    Assessment/Plan: 54yFemale s/p b/l nipple sparing mastectomies, TE recon with PRS  -pain control  -drain teaching  -strict I&O  -VTE ppx  -d/c planning with drains today

## 2019-09-12 NOTE — DISCHARGE NOTE PROVIDER - CARE PROVIDERS DIRECT ADDRESSES
,DirectAddress_Unknown,[email or web address] ,DirectAddress_Unknown

## 2019-09-12 NOTE — DISCHARGE NOTE PROVIDER - NSDCCPCAREPLAN_GEN_ALL_CORE_FT
PRINCIPAL DISCHARGE DIAGNOSIS  Diagnosis: Malignant neoplasm of breast  Assessment and Plan of Treatment:

## 2019-09-12 NOTE — PROGRESS NOTE ADULT - ASSESSMENT
53F s/p bilateral mastectomy and tissue expander reconstruction (prepectoral with alloderm) POD1    Plan:  -continue foam tape  -keep dressings dry and intact until follow up  -drains in place serosang  -has scripts for bactrim and dilaudid  -ok for dc from prs perspective    Please follow up with Dr. Valentin within x1 week after discharge from the hospital. You may call [phone] to schedule an appointment.

## 2019-09-12 NOTE — DISCHARGE NOTE PROVIDER - PROVIDER TOKENS
PROVIDER:[TOKEN:[[id number]],FOLLOWUP:[1 week]],PROVIDER:[TOKEN:[[id number]],FOLLOWUP:[1 week]] PROVIDER:[TOKEN:[[id number]],FOLLOWUP:[1 week]]

## 2019-09-12 NOTE — PROVIDER CONTACT NOTE (OTHER) - SITUATION
For Pt Laura Campos Rm [id number] Pt currently hypotensive- c/o dizziness when getting out of bed. See Vitals flowsheet
Patient was complaining of a "crushing pain" in her left breast. She is also hypotensive 92/59, repeat BP 99/62.
Pt with c/o of chest pressure and pain after ambulating in [place].  Pain level 8.
For Pt Laura Campos Pt c.o L-chest pain   Pt B/L breast assessed- pt appears to have some swelling above bilateral breast

## 2019-09-12 NOTE — DISCHARGE NOTE PROVIDER - NSDCFUADDINST_GEN_ALL_CORE_FT
WOUND CARE:    BATHING: Please do not submerge wound underwater. You may shower and/or sponge bathe.  ACTIVITY: No heavy lifting or straining. Otherwise, you may return to your usual level of physical activity. If you are taking narcotic pain medication (such as Percocet) DO NOT drive a car, operate machinery or make important decisions.  DIET: Return to your usual diet.  NOTIFY YOUR SURGEON IF: You have any bleeding that does not stop, any pus draining from your wound(s), any fever (over 100.4 F) or chills, persistent nausea/vomiting, persistent diarrhea, or if your pain is not controlled on your discharge pain medications.  You will be discharged with RHIANNA drains. You will need to empty them and record outputs accurately. This will be taught to you by the nursing staff. Please do not remove the RHIANNA drains. They will be removed in the office. Please bring to the office accurate records of output.   FOLLOW-UP: Please follow up with your primary care physician in one week regarding your hospitalization WOUND CARE:    BATHING: Please do not submerge wound underwater. You may shower and/or sponge bathe.  ACTIVITY: No heavy lifting or straining. Otherwise, you may return to your usual level of physical activity. If you are taking narcotic pain medication (such as Percocet) DO NOT drive a car, operate machinery or make important decisions.  DIET: Return to your usual diet.  NOTIFY YOUR SURGEON IF: You have any bleeding that does not stop, any pus draining from your wound(s), any fever (over 100.4 F) or chills, persistent nausea/vomiting, persistent diarrhea, or if your pain is not controlled on your discharge pain medications.  You will be discharged with RHIANNA drains. You will need to empty them and record outputs accurately. This will be taught to you by the nursing staff. Please do not remove the RHIANNA drains. They will be removed in the office. Please bring to the office accurate records of output.   FOLLOW-UP: Please follow up with your primary care physician in one week regarding your hospitalization  Patient has prescription for Bactrim and Dilaudid

## 2019-09-12 NOTE — PROVIDER CONTACT NOTE (OTHER) - ACTION/TREATMENT ORDERED:
MD notified, tylenol IV given, patient expressed relief. Will continue to monitor.
IVF and EKG ordered
Stat EKG done.   Dilaudid given as per orders.  Will continue to assess level of pain.
Will assess patient

## 2019-09-12 NOTE — PROGRESS NOTE ADULT - SUBJECTIVE AND OBJECTIVE BOX
ANESTHESIA POSTOP NOTE  54y Female POSTOP DAY 1    Vital Signs Last 24 Hrs  T(C): 36.8 (12 Sep 2019 09:54), Max: 37.1 (11 Sep 2019 13:30)  T(F): 98.2 (12 Sep 2019 09:54), Max: 98.8 (11 Sep 2019 13:30)  HR: 86 (12 Sep 2019 09:54) (70 - 91)  BP: 109/52 (12 Sep 2019 09:54) (92/59 - 130/79)  BP(mean): 91 (11 Sep 2019 14:45) (75 - 91)  RR: 16 (12 Sep 2019 09:54) (15 - 169)  SpO2: 95% (12 Sep 2019 09:54) (94% - 100%)  I&O's Summary    11 Sep 2019 07:01  -  12 Sep 2019 07:00  --------------------------------------------------------  IN: 950 mL / OUT: 2555 mL / NET: -1605 mL    12 Sep 2019 07:01  -  12 Sep 2019 13:09  --------------------------------------------------------  IN: 200 mL / OUT: 777 mL / NET: -577 mL        [ X ] NO APPARENT ANESTHESIA COMPLICATIONS      Comments:

## 2019-09-12 NOTE — PROVIDER CONTACT NOTE (OTHER) - ASSESSMENT
Patient is asymptomatic regarding her BP, denies lightheadedness.
Pt awake alert and oriented x3. Pt denies complain of dyspnea or shortness of breath.  Vital signs, /58, P76, R18, O2sat 98% on room air.
Pt c/o dizziness when getting out of bed  A&Ox4   VSS- see flowsheet
Pt denies any SOB, difficulty breathing,   VSS

## 2019-09-12 NOTE — DISCHARGE NOTE PROVIDER - CARE PROVIDER_API CALL
Fidel Adames)  Surgery  [address] [place] [address]  Phone: [phone]  Fax: [phone]  Follow Up Time: 1 week    Jg Valentin; ROBBIE)  Otolaryngology; Plastic Surgery  [address] [place] [address] [address]  [place] [address]  Phone: [phone]  Fax: [phone]  Follow Up Time: 1 week Fidel Adames (MD)  Surgery  [address] [place] [address]  Phone: [phone]  Fax: [phone]  Follow Up Time: 1 week

## 2019-09-12 NOTE — PROGRESS NOTE ADULT - SUBJECTIVE AND OBJECTIVE BOX
Plastic Surgery Progress Note (pg [facility]: [id number], [facility]: [phone])    SUBJECTIVE:  Had pruritis overnight. Feels much better now.  Vital Signs Last 24 Hrs  T(C): 37.2 (05-15-19 @ 05:36), Max: 37.2 (05-14-19 @ 11:45)  T(F): 99 (05-15-19 @ 05:36), Max: 99 (05-15-19 @ 05:36)  HR: 100 (05-15-19 @ 05:36) (86 - 100)  BP: 129/60 (05-15-19 @ 05:36) (119/55 - 139/69)  BP(mean): --  RR: 20 (05-15-19 @ 05:36) (20 - 21)  SpO2: 96% (05-15-19 @ 05:36) (95% - 97%)  I&O's Detail    14 May 2019 07:01  -  15 May 2019 07:00  --------------------------------------------------------  IN:    sodium chloride 0.45%: 2875 mL  Total IN: 2875 mL    OUT:    Voided: 1400 mL  Total OUT: 1400 mL    Total NET: 1475 mL      Gen: well appearing female, NAD, faint malar rash  	Breast: bilateral breasts soft, no palpable collections. Overlying skin with maculopapular rash consistent with other areas, incision CDI,   Skin: Maculopapular rash on face, arms, legs, stomach, blanches, no discharge- overall stable from yesterday

## 2019-09-12 NOTE — PROVIDER CONTACT NOTE (OTHER) - BACKGROUND
Patient is status post B/L mastectomy.
Pt s/p B/L mastectomy   JPx2
Pt s/p bilateral mastectomy.
Pt s/p B/L mastectomy 09/11   RHIANNA drain x2

## 2019-09-12 NOTE — DISCHARGE NOTE PROVIDER - NSDCCPTREATMENT_GEN_ALL_CORE_FT
PRINCIPAL PROCEDURE  Procedure: Nipple sparing mastectomy of both breasts  Findings and Treatment:       SECONDARY PROCEDURE  Procedure: Breast reconstruction with tissue expander  Findings and Treatment:

## 2019-09-13 ENCOUNTER — TRANSCRIPTION ENCOUNTER (OUTPATIENT)
Age: 54
End: 2019-09-13

## 2019-09-13 VITALS
HEART RATE: 74 BPM | TEMPERATURE: 99 F | OXYGEN SATURATION: 100 % | DIASTOLIC BLOOD PRESSURE: 66 MMHG | SYSTOLIC BLOOD PRESSURE: 105 MMHG | RESPIRATION RATE: 18 BRPM

## 2019-09-13 LAB — SURGICAL PATHOLOGY STUDY: SIGNIFICANT CHANGE UP

## 2019-09-13 RX ADMIN — Medication 400 MILLIGRAM(S): at 10:35

## 2019-09-13 RX ADMIN — Medication 400 MILLIGRAM(S): at 13:12

## 2019-09-13 RX ADMIN — Medication 1000 MILLIGRAM(S): at 11:05

## 2019-09-13 RX ADMIN — Medication 400 MILLIGRAM(S): at 06:09

## 2019-09-13 RX ADMIN — Medication 400 MILLIGRAM(S): at 07:05

## 2019-09-13 RX ADMIN — Medication 100 MILLIGRAM(S): at 00:25

## 2019-09-13 RX ADMIN — Medication 100 MILLIGRAM(S): at 06:10

## 2019-09-13 RX ADMIN — Medication 100 MILLIGRAM(S): at 08:29

## 2019-09-13 RX ADMIN — Medication 400 MILLIGRAM(S): at 12:21

## 2019-09-13 RX ADMIN — Medication 400 MILLIGRAM(S): at 01:25

## 2019-09-13 RX ADMIN — SODIUM CHLORIDE 3 MILLILITER(S): 9 INJECTION INTRAMUSCULAR; INTRAVENOUS; SUBCUTANEOUS at 13:23

## 2019-09-13 RX ADMIN — Medication 400 MILLIGRAM(S): at 00:26

## 2019-09-13 RX ADMIN — ENOXAPARIN SODIUM 40 MILLIGRAM(S): 100 INJECTION SUBCUTANEOUS at 12:24

## 2019-09-13 RX ADMIN — SODIUM CHLORIDE 3 MILLILITER(S): 9 INJECTION INTRAMUSCULAR; INTRAVENOUS; SUBCUTANEOUS at 06:09

## 2019-09-13 NOTE — PROGRESS NOTE ADULT - ASSESSMENT
Assessment/Plan: 54yFemale s/p b/l nipple sparing mastectomies, TE recon with PRS.  -pain control  -drain teaching  -strict I&O  -VTE ppx  -d/c planning with drains today    Team D Surgery [id number]

## 2019-09-13 NOTE — PROGRESS NOTE ADULT - SUBJECTIVE AND OBJECTIVE BOX
TEAM D SURGERY PROGRESS NOTE    Interval events: O/N c/o chest pain, likely incisional, EKG wnl    S: Patient doing well, denies fevers, chills, SOB. Pain improved. +nausea, no emesis. Passing flatus, not yet passing BM.    O: Vital Signs  T(C): 36.7 (09-13 @ 06:07), Max: 37.1 (09-12 @ 14:40)  HR: 68 (09-13 @ 06:07) (67 - 86)  BP: 101/62 (09-13 @ 06:07) (90/58 - 117/58)  RR: 18 (09-13 @ 06:07) (16 - 18)  SpO2: 98% (09-13 @ 06:07) (95% - 100%)  09-12-19 @ 07:01  -  09-13-19 @ 07:00  --------------------------------------------------------  IN: 1650 mL / OUT: 1709 mL / NET: -59 mL      General: alert and oriented, NAD  Resp: airway patent, respirations unlabored  CVS: regular rate and rhythm  Breast/axillae: incisions c/d/i, axillae soft, no palpable hematoma, RHIANNA drain b/l sersanguinous  Abdomen: soft, nontender, nondistended  Extremities: no edema  Skin: warm, dry, appropriate color                          11.3   11.62 )-----------( 259      ( 12 Sep 2019 05:30 )             35.2   09-12    142  |  104  |  13  ----------------------------<  117<H>  3.7   |  24  |  0.54    Ca    8.3<L>      12 Sep 2019 05:30  Phos  4.1     09-12  Mg     1.8     09-12

## 2019-09-13 NOTE — PROGRESS NOTE ADULT - SUBJECTIVE AND OBJECTIVE BOX
Plastic Surgery    SUBJECTIVE:   Patient feels better today, reports she feels less light headed but has some nausea    VITALS  T(C): 36.7 (09-13-19 @ 06:07), Max: 37.1 (09-12-19 @ 14:40)  HR: 68 (09-13-19 @ 06:07) (67 - 86)  BP: 101/62 (09-13-19 @ 06:07) (90/58 - 117/58)  RR: 18 (09-13-19 @ 06:07) (16 - 18)  SpO2: 98% (09-13-19 @ 06:07) (95% - 100%)  CAPILLARY BLOOD GLUCOSE          Is/Os    09-12 @ 07:01  -  09-13 @ 07:00  --------------------------------------------------------  IN:    dextrose 5% + sodium chloride 0.45% with potassium chloride 20 mEq/L: 1000 mL    IV PiggyBack: 50 mL    Oral Fluid: 600 mL  Total IN: 1650 mL    OUT:    Bulb: 142 mL    Bulb: 167 mL    Voided: 1400 mL  Total OUT: 1709 mL    Total NET: -59 mL          PHYSICAL EXAM:   General: NAD, ambulating in room  Chest: Mastectomy flaps with expected ecchymoses, foam tape has been removed. No collections, drains SS    MEDICATIONS (STANDING): clindamycin IVPB 600 milliGRAM(s) IV Intermittent every 8 hours  dextrose 5% + sodium chloride 0.45% with potassium chloride 20 mEq/L 1000 milliLiter(s) IV Continuous <Continuous>  docusate sodium 100 milliGRAM(s) Oral three times a day  enoxaparin Injectable 40 milliGRAM(s) SubCutaneous daily  ibuprofen  Tablet. 400 milliGRAM(s) Oral every 6 hours  influenza   Vaccine 0.5 milliLiter(s) IntraMuscular once  sodium chloride 0.9% lock flush 3 milliLiter(s) IV Push every 8 hours    MEDICATIONS (PRN):acetaminophen  IVPB .. 1000 milliGRAM(s) IV Intermittent every 6 hours PRN Mild Pain (1 - 3)  HYDROmorphone   Tablet 2 milliGRAM(s) Oral every 4 hours PRN Moderate Pain (4 - 6)  HYDROmorphone   Tablet 4 milliGRAM(s) Oral every 4 hours PRN Severe Pain (7 - 10)  metoclopramide 5 milliGRAM(s) Oral every 8 hours PRN nausea      LABS  CBC (09-12 @ 05:30)                              11.3<L>                         11.62<H>  )----------------(  259        --    % Neutrophils, --    % Lymphocytes, ANC: --                                  35.2      BMP (09-12 @ 05:30)             142     |  104     |  13    		Ca++ --      Ca 8.3<L>             ---------------------------------( 117<H>		Mg 1.8                3.7     |  24      |  0.54  			Ph 4.1                   IMAGING STUDIES

## 2019-09-13 NOTE — PROGRESS NOTE ADULT - ASSESSMENT
53F s/p bilateral mastectomy and tissue expander reconstruction (prepectoral with alloderm) POD1    Plan:  - Encourage ambulation  -drains in place serosang  -has scripts for bactrim and dilaudid  -ok for dc from prs perspective    Please follow up with Dr. Valentin within x1 week after discharge from the hospital. You may call [phone] to schedule an appointment.

## 2019-09-13 NOTE — DISCHARGE NOTE NURSING/CASE MANAGEMENT/SOCIAL WORK - PATIENT PORTAL LINK FT
You can access the FollowMyHealth Patient Portal offered by [facility] by registering at the following website: [email or web address]. By joining [employer]’s FollowMyHealth portal, you will also be able to view your health information using other applications (apps) compatible with our system.

## 2019-09-13 NOTE — DISCHARGE NOTE NURSING/CASE MANAGEMENT/SOCIAL WORK - NSDCPETBCESMAN_GEN_ALL_CORE
If you are a smoker, it is important for your health to stop smoking. Please be aware that second hand smoke is also harmful.
Patient

## 2019-09-16 ENCOUNTER — APPOINTMENT (OUTPATIENT)
Dept: PLASTIC SURGERY | Facility: CLINIC | Age: 54
End: 2019-09-16
Payer: COMMERCIAL

## 2019-09-16 PROCEDURE — 99024 POSTOP FOLLOW-UP VISIT: CPT

## 2019-09-16 NOTE — REASON FOR VISIT
[Follow-Up: _____] : a [unfilled] follow-up visit [Family Member] : family member [FreeTextEntry1] : s/p bilateral breast reconstruction with tissue expanders 9/11/19

## 2019-09-19 ENCOUNTER — APPOINTMENT (OUTPATIENT)
Dept: PLASTIC SURGERY | Facility: CLINIC | Age: 54
End: 2019-09-19
Payer: COMMERCIAL

## 2019-09-19 PROCEDURE — 99024 POSTOP FOLLOW-UP VISIT: CPT

## 2019-09-23 ENCOUNTER — APPOINTMENT (OUTPATIENT)
Dept: PLASTIC SURGERY | Facility: CLINIC | Age: 54
End: 2019-09-23
Payer: COMMERCIAL

## 2019-09-23 PROCEDURE — 99024 POSTOP FOLLOW-UP VISIT: CPT

## 2019-09-26 ENCOUNTER — APPOINTMENT (OUTPATIENT)
Dept: SURGICAL ONCOLOGY | Facility: CLINIC | Age: 54
End: 2019-09-26
Payer: COMMERCIAL

## 2019-09-26 VITALS
BODY MASS INDEX: 35.33 KG/M2 | WEIGHT: 192 LBS | HEART RATE: 98 BPM | SYSTOLIC BLOOD PRESSURE: 118 MMHG | DIASTOLIC BLOOD PRESSURE: 81 MMHG | TEMPERATURE: 98.1 F | OXYGEN SATURATION: 97 % | RESPIRATION RATE: 18 BRPM | HEIGHT: 62 IN

## 2019-09-26 DIAGNOSIS — Z91.89 OTHER SPECIFIED PERSONAL RISK FACTORS, NOT ELSEWHERE CLASSIFIED: ICD-10-CM

## 2019-09-26 PROCEDURE — 99024 POSTOP FOLLOW-UP VISIT: CPT

## 2019-09-26 NOTE — REASON FOR VISIT
[Post-Op] : a post-op for [FreeTextEntry2] : s/p bilateral simple nipple-sparing mastectomies on 9/11/19

## 2019-09-26 NOTE — REASON FOR VISIT
Called patient and conveyed message below. Patient verbalized understanding and had no further questions or concerns at this time. [Post-Op] : a post-op for [FreeTextEntry2] : s/p bilateral simple nipple-sparing mastectomies on 9/11/19

## 2019-09-27 NOTE — REASON FOR VISIT
[Follow-Up: _____] : a [unfilled] follow-up visit [FreeTextEntry1] : s/p bilateral breast reconstruction with tissue expanders 9/11/19

## 2019-10-03 ENCOUNTER — APPOINTMENT (OUTPATIENT)
Dept: PLASTIC SURGERY | Facility: CLINIC | Age: 54
End: 2019-10-03
Payer: COMMERCIAL

## 2019-10-03 PROCEDURE — 99024 POSTOP FOLLOW-UP VISIT: CPT

## 2019-10-07 ENCOUNTER — APPOINTMENT (OUTPATIENT)
Dept: PLASTIC SURGERY | Facility: CLINIC | Age: 54
End: 2019-10-07
Payer: COMMERCIAL

## 2019-10-07 PROCEDURE — 99024 POSTOP FOLLOW-UP VISIT: CPT

## 2019-10-07 NOTE — CONSULT NOTE ADULT - CONSULT REQUESTED DATE/TIME
Today's INR is 1.8.  Pt denies med changes or bleeding problems. Pt restarted coumadin last Monday after having a sigmoidoscopy by Dr Guerrero. Due to pt kidney function she is not a candidate for Lovenox bridging. Dose adjusted today only and pt instructed to hold green veggies until Friday; pt verbalized. Patient instructed regarding medication; results given and questions answered. Nutritional counseling given.  Dietary factors affecting therapy addressed.  Patient instructed to monitor for excessive bruising or bleeding. Will recheck next week.      This document has been electronically signed by ERIKA PanCNP-BC @  On October 7, 2019 10:43 AM     13-May-2019 15:13

## 2019-10-09 NOTE — ASSESSMENT
[FreeTextEntry1] : 54 year old woman with strong family history of breast cancer, personal bilateral LCIS, now s/p bilateral mastectomies with tissue expander reconstruction. She will continue to follow with Dr. Valentin for permanent implant placement. Clinical follow up in 1 year with me. No adjuvant treatment needed given her final pathology.

## 2019-10-09 NOTE — HISTORY OF PRESENT ILLNESS
[de-identified] : Ms. Campos is a 53 year old woman who initially presented with a report of an abnormal mammogram. She denied any new breast masses, pain, nipple inversion, nipple discharge, or skin/contour abnormalities of the breast. She denied prior breast surgeries or breast biopsies. She ultimately underwent biospsy demonstrating LCIS involving the right breast.\par \par Of note she reports breast cancer occurrence in her mother, her maternal aunt, and her younger sister, who just underwent bilateral mastectomies with reconstruction for newly diagnosed breast cancer. \par \par On 5/3/19 she underwent a right breast lumpectomy and sentinel lymph node biopsy.  Final pathology demonstrated LCIS with 5 negative sentinel lymph nodes.  \par \par On 9/11/19 she underwent bilateral nipple-sparing mastectomies (INDICATION?????????).  There was no evidence of residual LCIS in the right breast and there was focal LCIS in the left breast. \par \par  [FreeTextEntry1] : Ms. Campos returns for 2 week post-op visit following bilateral nipple sparing mastectomies for LCIS with a strong family history of breast cancer. She underwent immediate tissue expander placement by Dr. Valentin at the time of surgery. She reports healing well and denies fevers/chills, worsening pain, or wound redness/drainage.\par \par Final pathology revealed a small focus of LCIS in the left breast and no further disease in the right breast. Both sides had retro-areolar tissue that was negative for neoplasm.

## 2019-10-09 NOTE — HISTORY OF PRESENT ILLNESS
[de-identified] : Ms. Campos is a 53 year old woman who initially presented with a report of an abnormal mammogram. She denied any new breast masses, pain, nipple inversion, nipple discharge, or skin/contour abnormalities of the breast. She denied prior breast surgeries or breast biopsies. She ultimately underwent biospsy demonstrating LCIS involving the right breast.\par \par Of note she reports breast cancer occurrence in her mother, her maternal aunt, and her younger sister, who just underwent bilateral mastectomies with reconstruction for newly diagnosed breast cancer. \par \par On 5/3/19 she underwent a right breast lumpectomy and sentinel lymph node biopsy.  Final pathology demonstrated LCIS with 5 negative sentinel lymph nodes.  \par \par On 9/11/19 she underwent bilateral nipple-sparing mastectomies (INDICATION?????????).  There was no evidence of residual LCIS in the right breast and there was focal LCIS in the left breast. \par \par  [FreeTextEntry1] : Ms. Campos returns for 2 week post-op visit following bilateral nipple sparing mastectomies for LCIS with a strong family history of breast cancer. She underwent immediate tissue expander placement by Dr. Valentin at the time of surgery. She reports healing well and denies fevers/chills, worsening pain, or wound redness/drainage.\par \par Final pathology revealed a small focus of LCIS in the left breast and no further disease in the right breast. Both sides had retro-areolar tissue that was negative for neoplasm.

## 2019-10-10 ENCOUNTER — APPOINTMENT (OUTPATIENT)
Dept: PLASTIC SURGERY | Facility: CLINIC | Age: 54
End: 2019-10-10
Payer: COMMERCIAL

## 2019-10-10 PROCEDURE — 99024 POSTOP FOLLOW-UP VISIT: CPT

## 2019-10-14 ENCOUNTER — APPOINTMENT (OUTPATIENT)
Dept: PLASTIC SURGERY | Facility: CLINIC | Age: 54
End: 2019-10-14
Payer: COMMERCIAL

## 2019-10-14 PROCEDURE — 99024 POSTOP FOLLOW-UP VISIT: CPT

## 2019-10-16 ENCOUNTER — APPOINTMENT (OUTPATIENT)
Dept: PLASTIC SURGERY | Facility: CLINIC | Age: 54
End: 2019-10-16
Payer: COMMERCIAL

## 2019-10-16 VITALS — TEMPERATURE: 99 F

## 2019-10-16 PROCEDURE — 99024 POSTOP FOLLOW-UP VISIT: CPT

## 2019-10-21 ENCOUNTER — APPOINTMENT (OUTPATIENT)
Dept: PLASTIC SURGERY | Facility: CLINIC | Age: 54
End: 2019-10-21
Payer: COMMERCIAL

## 2019-10-21 VITALS — HEIGHT: 62 IN | TEMPERATURE: 98.2 F | BODY MASS INDEX: 34.96 KG/M2 | WEIGHT: 190 LBS

## 2019-10-21 PROCEDURE — 99024 POSTOP FOLLOW-UP VISIT: CPT

## 2019-10-23 ENCOUNTER — APPOINTMENT (OUTPATIENT)
Dept: PLASTIC SURGERY | Facility: CLINIC | Age: 54
End: 2019-10-23
Payer: COMMERCIAL

## 2019-10-23 PROCEDURE — 99024 POSTOP FOLLOW-UP VISIT: CPT

## 2019-10-28 ENCOUNTER — APPOINTMENT (OUTPATIENT)
Dept: PLASTIC SURGERY | Facility: CLINIC | Age: 54
End: 2019-10-28
Payer: COMMERCIAL

## 2019-10-28 PROCEDURE — 99024 POSTOP FOLLOW-UP VISIT: CPT

## 2019-11-04 ENCOUNTER — APPOINTMENT (OUTPATIENT)
Dept: PLASTIC SURGERY | Facility: CLINIC | Age: 54
End: 2019-11-04
Payer: COMMERCIAL

## 2019-11-04 PROCEDURE — 99024 POSTOP FOLLOW-UP VISIT: CPT

## 2019-11-06 ENCOUNTER — APPOINTMENT (OUTPATIENT)
Dept: GYNECOLOGIC ONCOLOGY | Facility: CLINIC | Age: 54
End: 2019-11-06

## 2019-11-06 ENCOUNTER — RECORD ABSTRACTING (OUTPATIENT)
Age: 54
End: 2019-11-06

## 2019-11-15 ENCOUNTER — RX RENEWAL (OUTPATIENT)
Age: 54
End: 2019-11-15

## 2019-12-09 ENCOUNTER — APPOINTMENT (OUTPATIENT)
Dept: PLASTIC SURGERY | Facility: CLINIC | Age: 54
End: 2019-12-09
Payer: COMMERCIAL

## 2019-12-09 PROCEDURE — 99024 POSTOP FOLLOW-UP VISIT: CPT

## 2019-12-19 ENCOUNTER — RX RENEWAL (OUTPATIENT)
Age: 54
End: 2019-12-19

## 2019-12-30 ENCOUNTER — APPOINTMENT (OUTPATIENT)
Dept: PLASTIC SURGERY | Facility: CLINIC | Age: 54
End: 2019-12-30
Payer: COMMERCIAL

## 2019-12-30 ENCOUNTER — APPOINTMENT (OUTPATIENT)
Dept: MRI IMAGING | Facility: IMAGING CENTER | Age: 54
End: 2019-12-30

## 2019-12-30 ENCOUNTER — OUTPATIENT (OUTPATIENT)
Dept: OUTPATIENT SERVICES | Facility: HOSPITAL | Age: 54
LOS: 1 days | End: 2019-12-30

## 2019-12-30 ENCOUNTER — APPOINTMENT (OUTPATIENT)
Dept: DERMATOLOGY | Facility: CLINIC | Age: 54
End: 2019-12-30
Payer: COMMERCIAL

## 2019-12-30 VITALS — BODY MASS INDEX: 34.55 KG/M2 | WEIGHT: 195 LBS | HEIGHT: 63 IN

## 2019-12-30 DIAGNOSIS — Z90.89 ACQUIRED ABSENCE OF OTHER ORGANS: Chronic | ICD-10-CM

## 2019-12-30 DIAGNOSIS — Z98.891 HISTORY OF UTERINE SCAR FROM PREVIOUS SURGERY: Chronic | ICD-10-CM

## 2019-12-30 DIAGNOSIS — Z90.49 ACQUIRED ABSENCE OF OTHER SPECIFIED PARTS OF DIGESTIVE TRACT: Chronic | ICD-10-CM

## 2019-12-30 DIAGNOSIS — Z00.8 ENCOUNTER FOR OTHER GENERAL EXAMINATION: ICD-10-CM

## 2019-12-30 DIAGNOSIS — Z98.890 OTHER SPECIFIED POSTPROCEDURAL STATES: Chronic | ICD-10-CM

## 2019-12-30 DIAGNOSIS — L30.9 DERMATITIS, UNSPECIFIED: ICD-10-CM

## 2019-12-30 PROCEDURE — 99214 OFFICE O/P EST MOD 30 MIN: CPT

## 2020-01-04 NOTE — CONSULT NOTE ADULT - ASSESSMENT
53 F hx GERD, recent R breast lumpectomy, LN bx, left breast reduction on 5/3 transferred from OSH for fevers, full body rash. Suspect allergic drug reaction 2/2 ?opiates.    - Dermatology consult called by primary team  - monitor off abx  - if allergic rxn, will get worse before getting better  - obtain BCx results from OSH  - monitor wbc, fever curve    d/w primary team Yes Yes

## 2020-01-23 ENCOUNTER — FORM ENCOUNTER (OUTPATIENT)
Age: 55
End: 2020-01-23

## 2020-01-24 ENCOUNTER — OUTPATIENT (OUTPATIENT)
Dept: OUTPATIENT SERVICES | Facility: HOSPITAL | Age: 55
LOS: 1 days | End: 2020-01-24
Payer: COMMERCIAL

## 2020-01-24 ENCOUNTER — APPOINTMENT (OUTPATIENT)
Dept: CT IMAGING | Facility: IMAGING CENTER | Age: 55
End: 2020-01-24
Payer: COMMERCIAL

## 2020-01-24 ENCOUNTER — APPOINTMENT (OUTPATIENT)
Dept: PLASTIC SURGERY | Facility: CLINIC | Age: 55
End: 2020-01-24
Payer: COMMERCIAL

## 2020-01-24 DIAGNOSIS — Z90.89 ACQUIRED ABSENCE OF OTHER ORGANS: Chronic | ICD-10-CM

## 2020-01-24 DIAGNOSIS — Z98.890 OTHER SPECIFIED POSTPROCEDURAL STATES: Chronic | ICD-10-CM

## 2020-01-24 DIAGNOSIS — Z00.8 ENCOUNTER FOR OTHER GENERAL EXAMINATION: ICD-10-CM

## 2020-01-24 DIAGNOSIS — Z98.891 HISTORY OF UTERINE SCAR FROM PREVIOUS SURGERY: Chronic | ICD-10-CM

## 2020-01-24 DIAGNOSIS — Z90.49 ACQUIRED ABSENCE OF OTHER SPECIFIED PARTS OF DIGESTIVE TRACT: Chronic | ICD-10-CM

## 2020-01-24 PROCEDURE — 71260 CT THORAX DX C+: CPT

## 2020-01-24 PROCEDURE — 71260 CT THORAX DX C+: CPT | Mod: 26

## 2020-01-24 PROCEDURE — 99214 OFFICE O/P EST MOD 30 MIN: CPT

## 2020-01-28 ENCOUNTER — FORM ENCOUNTER (OUTPATIENT)
Age: 55
End: 2020-01-28

## 2020-01-29 ENCOUNTER — APPOINTMENT (OUTPATIENT)
Dept: PLASTIC SURGERY | Facility: CLINIC | Age: 55
End: 2020-01-29
Payer: COMMERCIAL

## 2020-01-29 ENCOUNTER — APPOINTMENT (OUTPATIENT)
Dept: CT IMAGING | Facility: CLINIC | Age: 55
End: 2020-01-29

## 2020-01-29 ENCOUNTER — OUTPATIENT (OUTPATIENT)
Dept: OUTPATIENT SERVICES | Facility: HOSPITAL | Age: 55
LOS: 1 days | End: 2020-01-29
Payer: COMMERCIAL

## 2020-01-29 ENCOUNTER — APPOINTMENT (OUTPATIENT)
Dept: GYNECOLOGIC ONCOLOGY | Facility: CLINIC | Age: 55
End: 2020-01-29
Payer: COMMERCIAL

## 2020-01-29 VITALS — HEIGHT: 63 IN | BODY MASS INDEX: 34.55 KG/M2 | WEIGHT: 195 LBS

## 2020-01-29 VITALS — SYSTOLIC BLOOD PRESSURE: 121 MMHG | DIASTOLIC BLOOD PRESSURE: 78 MMHG

## 2020-01-29 DIAGNOSIS — Z80.7 FAMILY HISTORY OF OTHER MALIGNANT NEOPLASMS OF LYMPHOID, HEMATOPOIETIC AND RELATED TISSUES: ICD-10-CM

## 2020-01-29 DIAGNOSIS — Z98.891 HISTORY OF UTERINE SCAR FROM PREVIOUS SURGERY: Chronic | ICD-10-CM

## 2020-01-29 DIAGNOSIS — Z90.89 ACQUIRED ABSENCE OF OTHER ORGANS: Chronic | ICD-10-CM

## 2020-01-29 DIAGNOSIS — Z90.49 ACQUIRED ABSENCE OF OTHER SPECIFIED PARTS OF DIGESTIVE TRACT: Chronic | ICD-10-CM

## 2020-01-29 DIAGNOSIS — Z00.8 ENCOUNTER FOR OTHER GENERAL EXAMINATION: ICD-10-CM

## 2020-01-29 DIAGNOSIS — Z13.71 ENCOUNTER FOR NONPROCREATIVE SCREENING FOR GENETIC DISEASE CARRIER STATUS: ICD-10-CM

## 2020-01-29 DIAGNOSIS — Z80.9 FAMILY HISTORY OF MALIGNANT NEOPLASM, UNSPECIFIED: ICD-10-CM

## 2020-01-29 DIAGNOSIS — Z98.890 OTHER SPECIFIED POSTPROCEDURAL STATES: Chronic | ICD-10-CM

## 2020-01-29 PROCEDURE — 99204 OFFICE O/P NEW MOD 45 MIN: CPT

## 2020-01-29 PROCEDURE — 74174 CTA ABD&PLVS W/CONTRAST: CPT

## 2020-01-29 PROCEDURE — 99499 UNLISTED E&M SERVICE: CPT | Mod: NC

## 2020-01-29 PROCEDURE — 74174 CTA ABD&PLVS W/CONTRAST: CPT | Mod: 26

## 2020-01-29 RX ORDER — HYDROMORPHONE HYDROCHLORIDE 2 MG/1
2 TABLET ORAL
Qty: 30 | Refills: 0 | Status: COMPLETED | COMMUNITY
Start: 2019-08-22 | End: 2020-01-29

## 2020-01-29 RX ORDER — SULFAMETHOXAZOLE AND TRIMETHOPRIM 800; 160 MG/1; MG/1
800-160 TABLET ORAL TWICE DAILY
Qty: 14 | Refills: 0 | Status: COMPLETED | COMMUNITY
Start: 2019-09-16 | End: 2020-01-29

## 2020-01-29 RX ORDER — SULFAMETHOXAZOLE AND TRIMETHOPRIM 800; 160 MG/1; MG/1
800-160 TABLET ORAL
Qty: 14 | Refills: 0 | Status: COMPLETED | COMMUNITY
Start: 2019-08-22 | End: 2020-01-29

## 2020-01-29 RX ORDER — DIAZEPAM 5 MG/1
5 TABLET ORAL
Qty: 10 | Refills: 0 | Status: COMPLETED | COMMUNITY
Start: 2019-10-16 | End: 2020-01-29

## 2020-01-29 RX ORDER — CEFADROXIL 500 MG/1
500 CAPSULE ORAL TWICE DAILY
Qty: 14 | Refills: 0 | Status: COMPLETED | COMMUNITY
Start: 2019-10-21 | End: 2020-01-29

## 2020-01-29 RX ORDER — OXYCODONE AND ACETAMINOPHEN 5; 325 MG/1; MG/1
5-325 TABLET ORAL
Qty: 30 | Refills: 0 | Status: COMPLETED | COMMUNITY
Start: 2019-04-15 | End: 2020-01-29

## 2020-02-07 ENCOUNTER — APPOINTMENT (OUTPATIENT)
Dept: PLASTIC SURGERY | Facility: CLINIC | Age: 55
End: 2020-02-07
Payer: COMMERCIAL

## 2020-02-07 PROCEDURE — 99024 POSTOP FOLLOW-UP VISIT: CPT

## 2020-02-08 NOTE — REASON FOR VISIT
[Post Op: _________] : a [unfilled] post op visit [FreeTextEntry1] : s/p revision of bilateral breast reconstruction with removal of TE and placement of implants 2/5/20

## 2020-02-12 ENCOUNTER — APPOINTMENT (OUTPATIENT)
Dept: PLASTIC SURGERY | Facility: CLINIC | Age: 55
End: 2020-02-12
Payer: COMMERCIAL

## 2020-02-12 PROCEDURE — 99024 POSTOP FOLLOW-UP VISIT: CPT

## 2020-02-13 NOTE — REASON FOR VISIT
[Follow-Up: _____] : a [unfilled] follow-up visit [Family Member] : family member [FreeTextEntry1] : s/p revision of bilateral breast reconstruction with placement of breast implants 2/5/20

## 2020-02-18 ENCOUNTER — APPOINTMENT (OUTPATIENT)
Dept: PLASTIC SURGERY | Facility: CLINIC | Age: 55
End: 2020-02-18
Payer: COMMERCIAL

## 2020-02-18 VITALS
OXYGEN SATURATION: 97 % | SYSTOLIC BLOOD PRESSURE: 118 MMHG | HEART RATE: 94 BPM | TEMPERATURE: 97.6 F | RESPIRATION RATE: 18 BRPM | WEIGHT: 195 LBS | BODY MASS INDEX: 34.55 KG/M2 | HEIGHT: 63 IN | DIASTOLIC BLOOD PRESSURE: 83 MMHG

## 2020-02-18 PROCEDURE — 99024 POSTOP FOLLOW-UP VISIT: CPT

## 2020-02-18 RX ORDER — SUCRALFATE 1 G
1 TABLET ORAL
Qty: 0 | Refills: 0 | DISCHARGE

## 2020-02-18 RX ORDER — OMEPRAZOLE 40 MG/1
40 CAPSULE, DELAYED RELEASE ORAL
Qty: 90 | Refills: 0 | Status: ACTIVE | COMMUNITY
Start: 2020-02-07

## 2020-02-18 RX ORDER — OMEPRAZOLE 10 MG/1
1 CAPSULE, DELAYED RELEASE ORAL
Qty: 0 | Refills: 0 | DISCHARGE

## 2020-02-18 NOTE — HISTORY OF PRESENT ILLNESS
[FreeTextEntry1] : Ms. BENITO GRUBBS is a 54 year old female with past medical history of LCIS of b/l breasts who is a pt of Dr. Valentin, pt states over the weekend she had a fever of 101 and noticed some redness and swelling associated with pain in her left breast, she was put on Cipro on 2/16/2020 which she has been taking as instructed and has noticed improvement in the redness and pain of her left breast. \par Denies fever today

## 2020-02-18 NOTE — ASSESSMENT
[FreeTextEntry1] : 55 yo female s/p TE removal and b/l breast implant insertion 2/5/2020\par pt seen and examined with Dr Flowers\par discussed with pt today that due to her associated symptoms of fever, erythema, and pain it was advised by Dr Flowers that the implant should be removed, the pocket washed out with antibiotics, and closed with a drain to let the infection be completely cleared.\par Pt understands and agrees

## 2020-02-18 NOTE — PHYSICAL EXAM
[de-identified] : b/l breasts are soft, implants in place \par left breast with more edema and surrounding erythema \par incisions c/d/i\par no palpable fluid collections b/l  [NI] : Normal

## 2020-02-19 ENCOUNTER — RESULT REVIEW (OUTPATIENT)
Age: 55
End: 2020-02-19

## 2020-02-19 ENCOUNTER — OUTPATIENT (OUTPATIENT)
Dept: OUTPATIENT SERVICES | Facility: HOSPITAL | Age: 55
LOS: 1 days | End: 2020-02-19
Payer: COMMERCIAL

## 2020-02-19 ENCOUNTER — INPATIENT (INPATIENT)
Facility: HOSPITAL | Age: 55
LOS: 0 days | Discharge: HOME | DRG: 909 | End: 2020-02-20
Attending: SURGERY | Admitting: SURGERY
Payer: COMMERCIAL

## 2020-02-19 VITALS
DIASTOLIC BLOOD PRESSURE: 88 MMHG | RESPIRATION RATE: 16 BRPM | TEMPERATURE: 99 F | SYSTOLIC BLOOD PRESSURE: 140 MMHG | HEART RATE: 88 BPM | HEIGHT: 62 IN | WEIGHT: 194.01 LBS

## 2020-02-19 VITALS
SYSTOLIC BLOOD PRESSURE: 116 MMHG | DIASTOLIC BLOOD PRESSURE: 82 MMHG | OXYGEN SATURATION: 98 % | HEART RATE: 89 BPM | WEIGHT: 194.01 LBS | RESPIRATION RATE: 16 BRPM | HEIGHT: 62 IN | TEMPERATURE: 97 F

## 2020-02-19 DIAGNOSIS — Z01.818 ENCOUNTER FOR OTHER PREPROCEDURAL EXAMINATION: ICD-10-CM

## 2020-02-19 DIAGNOSIS — Z90.13 ACQUIRED ABSENCE OF BILATERAL BREASTS AND NIPPLES: Chronic | ICD-10-CM

## 2020-02-19 DIAGNOSIS — Z90.13 ACQUIRED ABSENCE OF BILATERAL BREASTS AND NIPPLES: ICD-10-CM

## 2020-02-19 DIAGNOSIS — Z90.89 ACQUIRED ABSENCE OF OTHER ORGANS: Chronic | ICD-10-CM

## 2020-02-19 DIAGNOSIS — D05.01 LOBULAR CARCINOMA IN SITU OF RIGHT BREAST: ICD-10-CM

## 2020-02-19 DIAGNOSIS — Z98.890 OTHER SPECIFIED POSTPROCEDURAL STATES: Chronic | ICD-10-CM

## 2020-02-19 DIAGNOSIS — Z90.49 ACQUIRED ABSENCE OF OTHER SPECIFIED PARTS OF DIGESTIVE TRACT: Chronic | ICD-10-CM

## 2020-02-19 DIAGNOSIS — Z98.891 HISTORY OF UTERINE SCAR FROM PREVIOUS SURGERY: Chronic | ICD-10-CM

## 2020-02-19 DIAGNOSIS — Z29.9 ENCOUNTER FOR PROPHYLACTIC MEASURES, UNSPECIFIED: ICD-10-CM

## 2020-02-19 LAB
ANION GAP SERPL CALC-SCNC: 15 MMOL/L — SIGNIFICANT CHANGE UP (ref 5–17)
BASOPHILS # BLD AUTO: 0.07 K/UL — SIGNIFICANT CHANGE UP (ref 0–0.2)
BASOPHILS NFR BLD AUTO: 0.8 % — SIGNIFICANT CHANGE UP (ref 0–2)
BUN SERPL-MCNC: 16 MG/DL — SIGNIFICANT CHANGE UP (ref 8–20)
CALCIUM SERPL-MCNC: 9.9 MG/DL — SIGNIFICANT CHANGE UP (ref 8.6–10.2)
CHLORIDE SERPL-SCNC: 99 MMOL/L — SIGNIFICANT CHANGE UP (ref 98–107)
CO2 SERPL-SCNC: 29 MMOL/L — SIGNIFICANT CHANGE UP (ref 22–29)
CREAT SERPL-MCNC: 0.6 MG/DL — SIGNIFICANT CHANGE UP (ref 0.5–1.3)
EOSINOPHIL # BLD AUTO: 0.5 K/UL — SIGNIFICANT CHANGE UP (ref 0–0.5)
EOSINOPHIL NFR BLD AUTO: 5.5 % — SIGNIFICANT CHANGE UP (ref 0–6)
GLUCOSE SERPL-MCNC: 91 MG/DL — SIGNIFICANT CHANGE UP (ref 70–99)
HCT VFR BLD CALC: 42.3 % — SIGNIFICANT CHANGE UP (ref 34.5–45)
HGB BLD-MCNC: 13.2 G/DL — SIGNIFICANT CHANGE UP (ref 11.5–15.5)
IMM GRANULOCYTES NFR BLD AUTO: 0.3 % — SIGNIFICANT CHANGE UP (ref 0–1.5)
LYMPHOCYTES # BLD AUTO: 3.28 K/UL — SIGNIFICANT CHANGE UP (ref 1–3.3)
LYMPHOCYTES # BLD AUTO: 35.9 % — SIGNIFICANT CHANGE UP (ref 13–44)
MCHC RBC-ENTMCNC: 27.2 PG — SIGNIFICANT CHANGE UP (ref 27–34)
MCHC RBC-ENTMCNC: 31.2 GM/DL — LOW (ref 32–36)
MCV RBC AUTO: 87 FL — SIGNIFICANT CHANGE UP (ref 80–100)
MONOCYTES # BLD AUTO: 0.52 K/UL — SIGNIFICANT CHANGE UP (ref 0–0.9)
MONOCYTES NFR BLD AUTO: 5.7 % — SIGNIFICANT CHANGE UP (ref 2–14)
NEUTROPHILS # BLD AUTO: 4.73 K/UL — SIGNIFICANT CHANGE UP (ref 1.8–7.4)
NEUTROPHILS NFR BLD AUTO: 51.8 % — SIGNIFICANT CHANGE UP (ref 43–77)
PLATELET # BLD AUTO: 362 K/UL — SIGNIFICANT CHANGE UP (ref 150–400)
POTASSIUM SERPL-MCNC: 4.1 MMOL/L — SIGNIFICANT CHANGE UP (ref 3.5–5.3)
POTASSIUM SERPL-SCNC: 4.1 MMOL/L — SIGNIFICANT CHANGE UP (ref 3.5–5.3)
RBC # BLD: 4.86 M/UL — SIGNIFICANT CHANGE UP (ref 3.8–5.2)
RBC # FLD: 16.1 % — HIGH (ref 10.3–14.5)
SODIUM SERPL-SCNC: 143 MMOL/L — SIGNIFICANT CHANGE UP (ref 135–145)
WBC # BLD: 9.13 K/UL — SIGNIFICANT CHANGE UP (ref 3.8–10.5)
WBC # FLD AUTO: 9.13 K/UL — SIGNIFICANT CHANGE UP (ref 3.8–10.5)

## 2020-02-19 PROCEDURE — 19371 PERI-IMPLT CAPSLC BRST COMPL: CPT | Mod: LT,78

## 2020-02-19 PROCEDURE — 88300 SURGICAL PATH GROSS: CPT | Mod: 26,59

## 2020-02-19 PROCEDURE — 88304 TISSUE EXAM BY PATHOLOGIST: CPT | Mod: 26

## 2020-02-19 PROCEDURE — G0463: CPT

## 2020-02-19 RX ORDER — SODIUM CHLORIDE 9 MG/ML
3 INJECTION INTRAMUSCULAR; INTRAVENOUS; SUBCUTANEOUS EVERY 8 HOURS
Refills: 0 | Status: DISCONTINUED | OUTPATIENT
Start: 2020-02-19 | End: 2020-02-20

## 2020-02-19 RX ORDER — SODIUM CHLORIDE 9 MG/ML
1000 INJECTION, SOLUTION INTRAVENOUS
Refills: 0 | Status: DISCONTINUED | OUTPATIENT
Start: 2020-02-19 | End: 2020-02-20

## 2020-02-19 RX ORDER — CETIRIZINE HYDROCHLORIDE, PSEUDOEPHEDRINE HYDROCHLORIDE 5; 120 MG/1; MG/1
1 TABLET, FILM COATED, EXTENDED RELEASE ORAL
Qty: 0 | Refills: 0 | DISCHARGE

## 2020-02-19 RX ORDER — OXYCODONE HYDROCHLORIDE 5 MG/1
5 TABLET ORAL EVERY 4 HOURS
Refills: 0 | Status: DISCONTINUED | OUTPATIENT
Start: 2020-02-19 | End: 2020-02-20

## 2020-02-19 RX ORDER — ONDANSETRON 8 MG/1
4 TABLET, FILM COATED ORAL ONCE
Refills: 0 | Status: DISCONTINUED | OUTPATIENT
Start: 2020-02-19 | End: 2020-02-19

## 2020-02-19 RX ORDER — KETOROLAC TROMETHAMINE 30 MG/ML
15 SYRINGE (ML) INJECTION EVERY 6 HOURS
Refills: 0 | Status: DISCONTINUED | OUTPATIENT
Start: 2020-02-19 | End: 2020-02-20

## 2020-02-19 RX ORDER — ONDANSETRON 8 MG/1
4 TABLET, FILM COATED ORAL EVERY 6 HOURS
Refills: 0 | Status: DISCONTINUED | OUTPATIENT
Start: 2020-02-19 | End: 2020-02-20

## 2020-02-19 RX ORDER — SENNA PLUS 8.6 MG/1
2 TABLET ORAL AT BEDTIME
Refills: 0 | Status: DISCONTINUED | OUTPATIENT
Start: 2020-02-19 | End: 2020-02-20

## 2020-02-19 RX ORDER — ACETAMINOPHEN 500 MG
1000 TABLET ORAL EVERY 6 HOURS
Refills: 0 | Status: DISCONTINUED | OUTPATIENT
Start: 2020-02-19 | End: 2020-02-20

## 2020-02-19 RX ORDER — CIPROFLOXACIN LACTATE 400MG/40ML
500 VIAL (ML) INTRAVENOUS EVERY 12 HOURS
Refills: 0 | Status: DISCONTINUED | OUTPATIENT
Start: 2020-02-20 | End: 2020-02-20

## 2020-02-19 RX ORDER — DIPHENHYDRAMINE HCL 50 MG
25 CAPSULE ORAL EVERY 4 HOURS
Refills: 0 | Status: DISCONTINUED | OUTPATIENT
Start: 2020-02-19 | End: 2020-02-20

## 2020-02-19 RX ORDER — SODIUM CHLORIDE 9 MG/ML
1000 INJECTION, SOLUTION INTRAVENOUS
Refills: 0 | Status: DISCONTINUED | OUTPATIENT
Start: 2020-02-19 | End: 2020-02-19

## 2020-02-19 RX ORDER — FENTANYL CITRATE 50 UG/ML
50 INJECTION INTRAVENOUS
Refills: 0 | Status: DISCONTINUED | OUTPATIENT
Start: 2020-02-19 | End: 2020-02-19

## 2020-02-19 NOTE — H&P PST ADULT - NSICDXPASTSURGICALHX_GEN_ALL_CORE_FT
PAST SURGICAL HISTORY:  H/O abdominoplasty     H/O:  2000, 2004, 2006    S/P bilateral mastectomy     S/P breast reconstruction, bilateral     S/P cholecystectomy     S/P tonsillectomy

## 2020-02-19 NOTE — H&P PST ADULT - ASSESSMENT
53 yo female s/p bilateral mastectomy and reconstruction.    CAPRINI VTE 2.0 SCORE [CLOT updated 2019]    AGE RELATED RISK FACTORS                                                       MOBILITY RELATED FACTORS  [ x ] Age 41-60 years                                            (1 Point)                    [ ] Bed rest                                                        (1 Point)  [ ] Age: 61-74 years                                           (2 Points)                  [ ] Plaster cast                                                   (2 Points)  [ ] Age= 75 years                                              (3 Points)                    [ ] Bed bound for more than 72 hours                 (2 Points)    DISEASE RELATED RISK FACTORS                                               GENDER SPECIFIC FACTORS  [ ] Edema in the lower extremities                       (1 Point)              [ ] Pregnancy                                                     (1 Point)  [ ] Varicose veins                                               (1 Point)                     [ ] Post-partum < 6 weeks                                   (1 Point)             [x ] BMI > 25 Kg/m2                                            (1 Point)                     [ ] Hormonal therapy  or oral contraception          (1 Point)                 [ ] Sepsis (in the previous month)                        (1 Point)               [ ] History of pregnancy complications                 (1 point)  [ ] Pneumonia or serious lung disease                                               [ ] Unexplained or recurrent                     (1 Point)           (in the previous month)                               (1 Point)  [ ] Abnormal pulmonary function test                     (1 Point)                 SURGERY RELATED RISK FACTORS  [ ] Acute myocardial infarction                              (1 Point)               [ ]  Section                                             (1 Point)  [ ] Congestive heart failure (in the previous month)  (1 Point)      [ ] Minor surgery                                                  (1 Point)   [ ] Inflammatory bowel disease                             (1 Point)               [ ] Arthroscopic surgery                                        (2 Points)  [ ] Central venous access                                      (2 Points)                [x ] General surgery lasting more than 45 minutes (2 points)  [ ] Malignancy- Present or previous                   (2 Points)                [ ] Elective arthroplasty                                         (5 points)    [ ] Stroke (in the previous month)                          (5 Points)                                                                                                                                                           HEMATOLOGY RELATED FACTORS                                                 TRAUMA RELATED RISK FACTORS  [ ] Prior episodes of VTE                                     (3 Points)                [ ] Fracture of the hip, pelvis, or leg                       (5 Points)  [ ] Positive family history for VTE                         (3 Points)             [ ] Acute spinal cord injury (in the previous month)  (5 Points)  [ ] Prothrombin 84049 A                                     (3 Points)               [ ] Paralysis  (less than 1 month)                             (5 Points)  [ ] Factor V Leiden                                             (3 Points)                  [ ] Multiple Trauma within 1 month                        (5 Points)  [ ] Lupus anticoagulants                                     (3 Points)                                                           [ ] Anticardiolipin antibodies                               (3 Points)                                                       [ ] High homocysteine in the blood                      (3 Points)                                             [ ] Other congenital or acquired thrombophilia      (3 Points)                                                [ ] Heparin induced thrombocytopenia                  (3 Points)                                     Total Score [     4     ]

## 2020-02-19 NOTE — H&P PST ADULT - NSICDXPROBLEM_GEN_ALL_CORE_FT
PROBLEM DIAGNOSES  Problem: Need for prophylactic measure  Assessment and Plan: Caprini score  4, mod risk for VTE, SCDs ordered, surgical team to assess need for pharm proph      Problem: H/O bilateral mastectomy  Assessment and Plan: Removal of left breast implant and capsulectomy

## 2020-02-19 NOTE — H&P PST ADULT - HISTORY OF PRESENT ILLNESS
55 yo female with history of double mastectomy, and family history of breast cancer.  Pt had left breast implant 2/5/20 became infected planning removal of implant.  Pt states symptoms improved with antibiotics.

## 2020-02-20 ENCOUNTER — TRANSCRIPTION ENCOUNTER (OUTPATIENT)
Age: 55
End: 2020-02-20

## 2020-02-20 VITALS
TEMPERATURE: 98 F | DIASTOLIC BLOOD PRESSURE: 60 MMHG | OXYGEN SATURATION: 92 % | SYSTOLIC BLOOD PRESSURE: 95 MMHG | HEART RATE: 64 BPM | RESPIRATION RATE: 18 BRPM

## 2020-02-20 PROCEDURE — 88304 TISSUE EXAM BY PATHOLOGIST: CPT

## 2020-02-20 PROCEDURE — 88300 SURGICAL PATH GROSS: CPT

## 2020-02-20 RX ORDER — L.ACIDOPH/B.ANIMALIS/B.LONGUM 15B CELL
1 CAPSULE ORAL
Qty: 30 | Refills: 0
Start: 2020-02-20 | End: 2020-03-20

## 2020-02-20 RX ORDER — OXYCODONE HYDROCHLORIDE 5 MG/1
1 TABLET ORAL
Qty: 8 | Refills: 0
Start: 2020-02-20

## 2020-02-20 RX ORDER — MOXIFLOXACIN HYDROCHLORIDE TABLETS, 400 MG 400 MG/1
1 TABLET, FILM COATED ORAL
Qty: 14 | Refills: 0
Start: 2020-02-20 | End: 2020-02-26

## 2020-02-20 RX ADMIN — SENNA PLUS 2 TABLET(S): 8.6 TABLET ORAL at 00:05

## 2020-02-20 RX ADMIN — Medication 400 MILLIGRAM(S): at 06:19

## 2020-02-20 RX ADMIN — Medication 15 MILLIGRAM(S): at 00:20

## 2020-02-20 RX ADMIN — SODIUM CHLORIDE 3 MILLILITER(S): 9 INJECTION INTRAMUSCULAR; INTRAVENOUS; SUBCUTANEOUS at 00:55

## 2020-02-20 RX ADMIN — SODIUM CHLORIDE 3 MILLILITER(S): 9 INJECTION INTRAMUSCULAR; INTRAVENOUS; SUBCUTANEOUS at 06:25

## 2020-02-20 RX ADMIN — Medication 400 MILLIGRAM(S): at 00:54

## 2020-02-20 RX ADMIN — Medication 500 MILLIGRAM(S): at 06:23

## 2020-02-20 RX ADMIN — Medication 15 MILLIGRAM(S): at 00:03

## 2020-02-20 RX ADMIN — Medication 1000 MILLIGRAM(S): at 07:06

## 2020-02-20 RX ADMIN — Medication 15 MILLIGRAM(S): at 07:06

## 2020-02-20 RX ADMIN — Medication 15 MILLIGRAM(S): at 06:17

## 2020-02-20 RX ADMIN — Medication 1000 MILLIGRAM(S): at 01:10

## 2020-02-20 NOTE — DISCHARGE NOTE PROVIDER - CARE PROVIDER_API CALL
Ayad Flowers)  Plastic Surgery; Surgery; Surgery of the Hand  [address], [address]  [place] [address]  Phone: [phone]  Fax: [phone]  Follow Up Time: 2 weeks

## 2020-02-20 NOTE — DISCHARGE NOTE PROVIDER - NSDCFUSCHEDAPPT_GEN_ALL_CORE_FT
BENITO GRUBBS ; 02/24/2020 ; [facility] [address] [facility]UB[facility] ; 02[facility]24/2020 [facility] [facility]BS ; 02/24/2020 ; [facility]

## 2020-02-20 NOTE — DISCHARGE NOTE NURSING/CASE MANAGEMENT/SOCIAL WORK - PATIENT PORTAL LINK FT
You can access the FollowMyHealth Patient Portal offered by [facility] by registering at the following website: [email or web address]. By joining [employer]’s FollowMyHealth portal, you will also be able to view your health information using other applications (apps) compatible with our system.

## 2020-02-20 NOTE — PROGRESS NOTE ADULT - SUBJECTIVE AND OBJECTIVE BOX
55 yo female with history of breast reconstruction with implant insertion 2/5/2020. Pt is now POD 1 from left breast implant removal and washout due to left breast cellulitis.   Pt is doing well, no complaints at this time.     VS   Temp: 98.2  Pulse: 64  BP: 95/60  Resp: 18    RHIANNA drain: 20 cc in 12 hours, serosanguinous drainage     Physical Exam:  left breast site is flat, minimal erythema, no palpable fluid collections or signs of hematoma, non tender to palpation  RHIANNA drain in place and functioning  right breast implant soft and in place, incision well healed     A/P  - pt cleared for discharge  - pt to be sent home with 7 day course of Cipro   - monitor for increasing redness, fever, chills, swelling, contact Dr. Flowers office of Dr. Valentin   -follow up in the office in 1 week for drain removal

## 2020-02-20 NOTE — DISCHARGE NOTE PROVIDER - HOSPITAL COURSE
HPI:    53 yo female with history of double mastectomy, and family history of breast cancer.  Pt had left breast implant 2/5/20 became infected planning removal of implant.  Pt states symptoms improved with antibiotics. Presents for elective procedure fro removal of left breast implant.        Hospital Course:    On 2/19 patient underwent procedure for removal of infected left breast implant and capsulectomy, which was uncomplicated. She was observed overnight and postoperatively did well. On 2/20, POD 1, patient was continuing to feel well, tolerating a diet, ambulating independently, and voiding without issue. She was discharged home with plan to follow up outpatient with Dr. Flowers in 2 weeks.

## 2020-02-20 NOTE — DISCHARGE NOTE PROVIDER - NSDCCPCAREPLAN_GEN_ALL_CORE_FT
PRINCIPAL DISCHARGE DIAGNOSIS  Diagnosis: Presence of left breast implant  Assessment and Plan of Treatment:

## 2020-02-20 NOTE — DISCHARGE NOTE PROVIDER - NSDCMRMEDTOKEN_GEN_ALL_CORE_FT
Cipro 500 mg oral tablet: 1 tab(s) orally every 12 hours  diphenhydrAMINE 50 mg oral capsule: 1 cap(s) orally every 6 hours  ibuprofen 400 mg oral tablet: 1 tab(s) orally every 6 hours  ZyrTEC 10 mg oral tablet: 1 tab(s) orally once a day

## 2020-02-20 NOTE — PROGRESS NOTE ADULT - ASSESSMENT
Patient is a 54y old Female s/p removal of left breast implant and capsulectomy which was uncomplicated. Patient admitted for observation post-operatively and felt well. AVSS and no complaints.    - Likely discharge home today  - Pain control as needed  - Follow-up outpatient.

## 2020-02-20 NOTE — DISCHARGE NOTE PROVIDER - NSDCFUADDINST_GEN_ALL_CORE_FT
FOLLOW UP: With Dr. Flowers in 2 weeks, please call to make or confirm appointment.    DIET: Resume regular diet as prior to admission    ACTIVITY: You may shower but do not submerge your wounds in water such as a bath or swimming.  Do not remove the tapes on you wounds, they will fall off on their own as you keep showering. Please strip and empty your drain daily, recording the output each morning.    MEDICATION: Resume all home meds as prior to admission.  You are encouraged to use Tylenol and/or Motrin as directed for pain.  Should this not be adequate Oxycodone, a narcotic has been prescribed. Take only as directed, do not drive, operate machinery, make important decisions or drink alcohol while taking.    RETURN TO THE EMERGENCY DEPARTMENT:  Return to the ED for any persistent pain not relieved with pain meds, inability to tolerate diet, persistent nausea/vomiting/diarrhea, fevers/chills, signs of wound infection such as increased swelling/redness/discharge of pus or any other concerns you might have. FOLLOW UP: With Dr. Flowers/Dr. Valentin in 1 week, please call to make or confirm appointment.    DIET: Resume regular diet as prior to admission    BATHING: You may shower but do not submerge your wounds in water such as a bath or swimming.  Do not remove the tapes on you wounds for 24 hours. After 24 hours remove the outer gauze and clear tape only, leave the steri strips/glue tape intact they will fall off on their own. Please strip and empty your drain daily, recording the date, time, and amount of output twice daily.    ACTIVITY: no heavy lifting with your left arm above 5 pounds, you may walk for exercise     MEDICATION: Resume all home meds as prior to admission.  You are encouraged to use Tylenol and/or Motrin as directed for pain.    RETURN TO THE EMERGENCY DEPARTMENT:  Return to the ED for any persistent pain not relieved with pain meds, inability to tolerate diet, persistent nausea/vomiting/diarrhea, fevers/chills, signs of wound infection such as increased swelling/redness/discharge of pus or any other concerns you might have.

## 2020-02-20 NOTE — DISCHARGE NOTE PROVIDER - NSDCCPTREATMENT_GEN_ALL_CORE_FT
PRINCIPAL PROCEDURE  Procedure: Capsulectomy of left breast with removal of implant  Findings and Treatment:

## 2020-02-20 NOTE — PROGRESS NOTE ADULT - SUBJECTIVE AND OBJECTIVE BOX
Post Operative Check    Patient is post op from a removal of left breast implant and capsulectomy and is found well. She was seen and examined at bedside. She does not report any pain at the moment, that Tylenol is working fine. Tolerating a diet and urinating without issue. Denies nausea, vomiting, fever, chills, chest pain, or difficulty breathing. IS pulling to 1500    Vitals    T(C): 36.4 (02-20-20 @ 04:46), Max: 37.1 (02-19-20 @ 13:51)  HR: 64 (02-20-20 @ 04:46) (64 - 100)  BP: 109/70 (02-20-20 @ 04:46) (94/57 - 140/88)  RR: 18 (02-20-20 @ 04:46) (16 - 20)  SpO2: 92% (02-20-20 @ 04:46) (92% - 98%)  Wt(kg): --      02-19 @ 07:01  -  02-20 @ 07:00  --------------------------------------------------------  IN:  Total IN: 0 mL    OUT:    Bulb: 20 mL    Voided: 200 mL  Total OUT: 220 mL    Total NET: -220 mL          Labs                        13.2   9.13  )-----------( 362      ( 19 Feb 2020 14:10 )             42.3       CBC Full  -  ( 19 Feb 2020 14:10 )  WBC Count : 9.13 K/uL  Hemoglobin : 13.2 g/dL  Hematocrit : 42.3 %  Platelet Count - Automated : 362 K/uL  Mean Cell Volume : 87.0 fl  Mean Cell Hemoglobin : 27.2 pg  Mean Cell Hemoglobin Concentration : 31.2 gm/dL  Auto Neutrophil # : 4.73 K/uL  Auto Lymphocyte # : 3.28 K/uL  Auto Monocyte # : 0.52 K/uL  Auto Eosinophil # : 0.50 K/uL  Auto Basophil # : 0.07 K/uL  Auto Neutrophil % : 51.8 %  Auto Lymphocyte % : 35.9 %  Auto Monocyte % : 5.7 %  Auto Eosinophil % : 5.5 %  Auto Basophil % : 0.8 %      Physical Exam  General: Pleasant woman sitting up in bed in NAD AAOx3   Cards: RRR S1S2  Resp: CTAB, unlabored breathing. Left chest wall incision with dressing c/d/i. no hematoma or tenderness  Abdomen: soft, non-distended, non--tender  Ext: Full ROM without issue

## 2020-02-23 LAB
-  AMPICILLIN/SULBACTAM: SIGNIFICANT CHANGE UP
-  CEFAZOLIN: SIGNIFICANT CHANGE UP
-  CLINDAMYCIN: SIGNIFICANT CHANGE UP
-  ERYTHROMYCIN: SIGNIFICANT CHANGE UP
-  GENTAMICIN: SIGNIFICANT CHANGE UP
-  OXACILLIN: SIGNIFICANT CHANGE UP
-  PENICILLIN: SIGNIFICANT CHANGE UP
-  RIFAMPIN: SIGNIFICANT CHANGE UP
-  TETRACYCLINE: SIGNIFICANT CHANGE UP
-  TRIMETHOPRIM/SULFAMETHOXAZOLE: SIGNIFICANT CHANGE UP
-  VANCOMYCIN: SIGNIFICANT CHANGE UP
METHOD TYPE: SIGNIFICANT CHANGE UP

## 2020-02-24 LAB — SURGICAL PATHOLOGY STUDY: SIGNIFICANT CHANGE UP

## 2020-02-25 LAB
CULTURE RESULTS: SIGNIFICANT CHANGE UP
ORGANISM # SPEC MICROSCOPIC CNT: SIGNIFICANT CHANGE UP
ORGANISM # SPEC MICROSCOPIC CNT: SIGNIFICANT CHANGE UP
SPECIMEN SOURCE: SIGNIFICANT CHANGE UP

## 2020-02-26 ENCOUNTER — APPOINTMENT (OUTPATIENT)
Dept: PLASTIC SURGERY | Facility: CLINIC | Age: 55
End: 2020-02-26
Payer: COMMERCIAL

## 2020-02-26 PROCEDURE — 99024 POSTOP FOLLOW-UP VISIT: CPT

## 2020-03-02 ENCOUNTER — APPOINTMENT (OUTPATIENT)
Dept: PLASTIC SURGERY | Facility: CLINIC | Age: 55
End: 2020-03-02
Payer: COMMERCIAL

## 2020-03-02 VITALS — BODY MASS INDEX: 34.02 KG/M2 | HEIGHT: 63 IN | WEIGHT: 192 LBS

## 2020-03-02 PROCEDURE — 99024 POSTOP FOLLOW-UP VISIT: CPT

## 2020-03-13 NOTE — PROGRESS NOTE ADULT - ASSESSMENT
This has never been prescribed by you, please advise if this is appropriate.    
1. Serum Sickness like reaction- given arthralgias, swelling, and urticarial papules and plaques- Morbilliform drug eruption also on the ddx. Involving the trunk bilateral arms, legs, face. Greatly improved today with prednisone. Swelling improved. Response to prednisone appears to be more consistent with serum sickness like reaction, advised patient on the waxing and waning nature of the rash even with the prednisone on board. Even one dose of a medication can result in this kind of reaction, explained to patient    -List Ancef as allergy for patient  -Continue 60mg for 3 days, 40mg for 3 days, 20mg for 3 days, 10mg for 3 days then OFF  -Triamcinolone ointment 0.1% BID to the AA for the body. Please provide multiple tubes of ointment as they are dispensed only in small tubes which will not cover the surface area affected.   -Hydrocortisone ointment 2.5% BID for the affected areas on the face   -Antihistamines around the clock. Zyrtec 10mg in the morning and afternoon, and Hydroxyzine 25mg at bedtime  -Punch biopsy results pending, we will call patient with results when available  -Okay to discharge from our standpoint, we will follow-up outpatient- provided patient with clinic information. Patient can follow-up with local dermatologist as well if she prefers closer to home in [place].     Discussed with primary team.  Patient seen with attending Dr. Barlow     Patient to follow up at [facility] [address] [place] [address] [address] [phone]    Nohemi Monsivais MD   Dermatology Resident PGY-2   [phone]
53 F  recent R breast lumpectomy, LN bx, left breast reduction on 5/3 transferred from OSH for fevers, full body rash. Concern for serum sickness, responding to steroids.    WBC trending down.    Blood cultures from [facility] no growth. [facility] no growth.    - Dermatology following- steroids as directed by derm  - monitor off abx  - f/u skin bx      Will d/c f/u.  Please call if further questions.    Melany Trejo MD  Pager: [phone]  After 5 PM or weekends please call fellow on call or office [phone]
53 F hx GERD, recent R breast lumpectomy, LN bx, left breast reduction on 5/3 transferred from OSH for fevers, full body rash. Concern for serum sickness, responding to steroids.    - Dermatology following- steroids as directed by derm  - monitor off abx  - f/u skin bx  - obtain BCx results from OSH  - BCx sent yday are neg to date  - monitor wbc, fever curve
53y f with diffuse rash/fevers 7 days post op from oncoplastic reduction and symmetrizing reduction    Plan:  - Appreciate Derm recs  -- f/u derm biopsy  - Appreciate ID recs  -- Afebrile w/o abx at this time. WBC stable  --will continue current regimen; await outpatient recs
53y f with diffuse rash/fevers 7 days post op from oncoplastic reduction and symmetrizing reduction    Plan:  - Appreciate Derm recs  -- f/u derm biopsy  -- Would appreciate recommendations for outpatient vs. inpatient management  - Appreciate ID recs  -- Afebrile w/o abx at this time. WBC stable

## 2020-03-21 LAB
CULTURE RESULTS: SIGNIFICANT CHANGE UP
SPECIMEN SOURCE: SIGNIFICANT CHANGE UP

## 2020-04-07 NOTE — H&P PST ADULT - GRAVIDA, OB PROFILE
Writer called to discuss AVS information from 4/2/2020, patient's mailbox is full and unable to leave a message. Writer will call again later.    3

## 2020-04-16 ENCOUNTER — APPOINTMENT (OUTPATIENT)
Dept: PLASTIC SURGERY | Facility: CLINIC | Age: 55
End: 2020-04-16

## 2020-09-17 ENCOUNTER — APPOINTMENT (OUTPATIENT)
Dept: SURGICAL ONCOLOGY | Facility: CLINIC | Age: 55
End: 2020-09-17

## 2020-09-17 ENCOUNTER — APPOINTMENT (OUTPATIENT)
Dept: SURGICAL ONCOLOGY | Facility: CLINIC | Age: 55
End: 2020-09-17
Payer: COMMERCIAL

## 2020-09-17 VITALS
OXYGEN SATURATION: 97 % | TEMPERATURE: 97.8 F | SYSTOLIC BLOOD PRESSURE: 122 MMHG | HEART RATE: 82 BPM | WEIGHT: 192 LBS | BODY MASS INDEX: 34.02 KG/M2 | HEIGHT: 63 IN | DIASTOLIC BLOOD PRESSURE: 83 MMHG

## 2020-09-17 DIAGNOSIS — Z90.49 ACQUIRED ABSENCE OF OTHER SPECIFIED PARTS OF DIGESTIVE TRACT: ICD-10-CM

## 2020-09-17 PROCEDURE — 99214 OFFICE O/P EST MOD 30 MIN: CPT

## 2020-09-17 RX ORDER — AZITHROMYCIN 250 MG/1
250 TABLET, FILM COATED ORAL
Qty: 6 | Refills: 0 | Status: DISCONTINUED | COMMUNITY
Start: 2020-01-30 | End: 2020-09-17

## 2020-09-17 RX ORDER — OSELTAMIVIR PHOSPHATE 75 MG/1
75 CAPSULE ORAL
Qty: 10 | Refills: 0 | Status: DISCONTINUED | COMMUNITY
Start: 2020-02-14 | End: 2020-09-17

## 2020-09-17 RX ORDER — CIPROFLOXACIN HYDROCHLORIDE 500 MG/1
500 TABLET, FILM COATED ORAL
Qty: 14 | Refills: 0 | Status: DISCONTINUED | COMMUNITY
Start: 2020-02-16 | End: 2020-09-17

## 2020-09-17 RX ORDER — MUPIROCIN 20 MG/G
2 OINTMENT TOPICAL
Qty: 22 | Refills: 0 | Status: DISCONTINUED | COMMUNITY
Start: 2019-10-21 | End: 2020-09-17

## 2020-09-17 RX ORDER — PREDNISONE 10 MG/1
10 TABLET ORAL
Qty: 46 | Refills: 0 | Status: DISCONTINUED | COMMUNITY
Start: 2019-09-05 | End: 2020-09-17

## 2020-09-17 RX ORDER — SULFAMETHOXAZOLE AND TRIMETHOPRIM 800; 160 MG/1; MG/1
800-160 TABLET ORAL
Qty: 14 | Refills: 0 | Status: DISCONTINUED | COMMUNITY
Start: 2020-02-04 | End: 2020-09-17

## 2020-09-17 RX ORDER — OXYCODONE AND ACETAMINOPHEN 5; 325 MG/1; MG/1
5-325 TABLET ORAL
Qty: 20 | Refills: 0 | Status: DISCONTINUED | COMMUNITY
Start: 2020-02-04 | End: 2020-09-17

## 2020-09-23 NOTE — REASON FOR VISIT
[Follow-Up Visit] : a follow-up visit for [Breast Calcifications] : breast calcifications [Breast Cancer] : breast cancer [FreeTextEntry2] : LCIS

## 2020-09-23 NOTE — HISTORY OF PRESENT ILLNESS
[de-identified] : Ms. Campos is a 55 year old woman who presents for 1-year follow up following bilateral mastectomies for LCIS and a strong family history of breast cancer. She reports feeling well but has had a difficult course following reconstruction as she develed a left breast infection requiring implant removal. She has been following with Dr. Valentin to discuss the timing of reoperative reconstruction. She denies feeling any new masses or skin changes in either breast.

## 2020-09-23 NOTE — PHYSICAL EXAM
[Normal] : normal breast inspection and palpation of axillas [Normal Neck Lymph Nodes] : normal neck lymph nodes  [Normal Supraclavicular Lymph Nodes] : normal supraclavicular lymph nodes [Normal Groin Lymph Nodes] : normal groin lymph nodes [Normal Axillary Lymph Nodes] : normal axillary lymph nodes [Normal] : oriented to person, place and time, with appropriate affect [de-identified] : right chest wall skin and FIONA flap without any concerning nodules, masses, or skin thickening/distortion. No axillary adenopathy. Left chest wall skin redundant, soft, no masses/nodularity. No reconstruction present at this time. Left axilla normal.

## 2020-09-23 NOTE — ASSESSMENT
[FreeTextEntry1] : 55 year old woman 1 year s/p bilateral mastectomies for LCIS. Currently clinically ISABEL. Doing well. She is awaiting reoperative left breast reconstruction when she feels mentally ready to go through the surgical process again. I have asked her to return in 1 year for repeat physical exam.

## 2020-09-24 ENCOUNTER — APPOINTMENT (OUTPATIENT)
Dept: SURGICAL ONCOLOGY | Facility: CLINIC | Age: 55
End: 2020-09-24

## 2021-04-08 ENCOUNTER — APPOINTMENT (OUTPATIENT)
Dept: PLASTIC SURGERY | Facility: CLINIC | Age: 56
End: 2021-04-08
Payer: COMMERCIAL

## 2021-04-08 ENCOUNTER — APPOINTMENT (OUTPATIENT)
Dept: ULTRASOUND IMAGING | Facility: CLINIC | Age: 56
End: 2021-04-08
Payer: COMMERCIAL

## 2021-04-08 ENCOUNTER — APPOINTMENT (OUTPATIENT)
Dept: SURGICAL ONCOLOGY | Facility: CLINIC | Age: 56
End: 2021-04-08
Payer: COMMERCIAL

## 2021-04-08 ENCOUNTER — OUTPATIENT (OUTPATIENT)
Dept: OUTPATIENT SERVICES | Facility: HOSPITAL | Age: 56
LOS: 1 days | End: 2021-04-08
Payer: COMMERCIAL

## 2021-04-08 VITALS
WEIGHT: 130 LBS | OXYGEN SATURATION: 100 % | DIASTOLIC BLOOD PRESSURE: 86 MMHG | HEART RATE: 66 BPM | HEIGHT: 66 IN | SYSTOLIC BLOOD PRESSURE: 134 MMHG | TEMPERATURE: 98 F | BODY MASS INDEX: 20.89 KG/M2

## 2021-04-08 VITALS
HEIGHT: 66 IN | HEART RATE: 83 BPM | OXYGEN SATURATION: 95 % | BODY MASS INDEX: 32.14 KG/M2 | DIASTOLIC BLOOD PRESSURE: 80 MMHG | SYSTOLIC BLOOD PRESSURE: 123 MMHG | WEIGHT: 200 LBS

## 2021-04-08 DIAGNOSIS — Z90.13 ACQUIRED ABSENCE OF BILATERAL BREASTS AND NIPPLES: Chronic | ICD-10-CM

## 2021-04-08 DIAGNOSIS — Z00.8 ENCOUNTER FOR OTHER GENERAL EXAMINATION: ICD-10-CM

## 2021-04-08 DIAGNOSIS — Z90.49 ACQUIRED ABSENCE OF OTHER SPECIFIED PARTS OF DIGESTIVE TRACT: Chronic | ICD-10-CM

## 2021-04-08 DIAGNOSIS — Z98.890 OTHER SPECIFIED POSTPROCEDURAL STATES: Chronic | ICD-10-CM

## 2021-04-08 DIAGNOSIS — Z90.89 ACQUIRED ABSENCE OF OTHER ORGANS: Chronic | ICD-10-CM

## 2021-04-08 DIAGNOSIS — Z98.891 HISTORY OF UTERINE SCAR FROM PREVIOUS SURGERY: Chronic | ICD-10-CM

## 2021-04-08 PROCEDURE — 76882 US LMTD JT/FCL EVL NVASC XTR: CPT | Mod: 26,LT

## 2021-04-08 PROCEDURE — 99072 ADDL SUPL MATRL&STAF TM PHE: CPT

## 2021-04-08 PROCEDURE — 99214 OFFICE O/P EST MOD 30 MIN: CPT

## 2021-04-08 PROCEDURE — 76882 US LMTD JT/FCL EVL NVASC XTR: CPT

## 2021-04-08 NOTE — ASSESSMENT
[FreeTextEntry1] : 55 year old woman now 18 months s/p bilateral mastectomies for bilateral LCIS and strong family history of breast cancer. Bilateral chest walls without concerning physical exam findings. There is mild soft adenopathy of the left axilla that I believe is c/w her history of multiple left chest wall surgeries as well as a prior prolonged infection. I provided reassurance that I believe the nodes are benign and reactive in nature but given her new symptoms I believe it is reasonable to undergo a left axillary US to assess the morphology of the nodes prior to proceeding with repeat reconstruction.

## 2021-04-08 NOTE — PHYSICAL EXAM
[Normal] : normal breast inspection and palpation of axillas [Normal Neck Lymph Nodes] : normal neck lymph nodes  [Normal Supraclavicular Lymph Nodes] : normal supraclavicular lymph nodes [Normal Groin Lymph Nodes] : normal groin lymph nodes [Normal Axillary Lymph Nodes] : normal axillary lymph nodes [Normal] : oriented to person, place and time, with appropriate affect [de-identified] : right chest wall skin and implant within normal exam. No axillary adenopathy. Left chest wall skin is tethered to chest wall in different places without a concerning nodule or mass. There is soft, mobile, compressible adenopathy of the left axilla without concerning physical exam features

## 2021-04-08 NOTE — HISTORY OF PRESENT ILLNESS
[de-identified] : Ms. Campos is a 55 year old woman who presents for 1-year follow up following bilateral mastectomies for LCIS and a strong family history of breast cancer. She reports feeling well but has had a difficult course following reconstruction as she develed a left breast infection requiring implant removal. She has been following with Dr. Valentin to discuss the timing of reoperative reconstruction. She denies feeling any new masses or skin changes in either breast. \par \par INTERIM 4/8/21: Ms. Campos presents today with a new symptom of "pinching" or tugging in the left axilla. She has noticed it for the last several weeks and possible months. She cannot feel any new masses in the chest or axilla. She saw Dr. Valentin earlier today to talk about scheduling delayed reconstruction of the left breast after she had to have her prior implant removed for infection.

## 2021-06-02 ENCOUNTER — OUTPATIENT (OUTPATIENT)
Dept: OUTPATIENT SERVICES | Facility: HOSPITAL | Age: 56
LOS: 1 days | End: 2021-06-02
Payer: COMMERCIAL

## 2021-06-02 VITALS
OXYGEN SATURATION: 98 % | RESPIRATION RATE: 18 BRPM | HEART RATE: 73 BPM | TEMPERATURE: 98 F | HEIGHT: 63 IN | SYSTOLIC BLOOD PRESSURE: 116 MMHG | DIASTOLIC BLOOD PRESSURE: 81 MMHG | WEIGHT: 206.35 LBS

## 2021-06-02 DIAGNOSIS — Z80.3 FAMILY HISTORY OF MALIGNANT NEOPLASM OF BREAST: ICD-10-CM

## 2021-06-02 DIAGNOSIS — Z01.818 ENCOUNTER FOR OTHER PREPROCEDURAL EXAMINATION: ICD-10-CM

## 2021-06-02 DIAGNOSIS — Z90.13 ACQUIRED ABSENCE OF BILATERAL BREASTS AND NIPPLES: Chronic | ICD-10-CM

## 2021-06-02 DIAGNOSIS — D05.12 INTRADUCTAL CARCINOMA IN SITU OF LEFT BREAST: ICD-10-CM

## 2021-06-02 DIAGNOSIS — Z98.891 HISTORY OF UTERINE SCAR FROM PREVIOUS SURGERY: Chronic | ICD-10-CM

## 2021-06-02 DIAGNOSIS — K21.9 GASTRO-ESOPHAGEAL REFLUX DISEASE WITHOUT ESOPHAGITIS: ICD-10-CM

## 2021-06-02 DIAGNOSIS — Z90.89 ACQUIRED ABSENCE OF OTHER ORGANS: Chronic | ICD-10-CM

## 2021-06-02 DIAGNOSIS — Z90.12 ACQUIRED ABSENCE OF LEFT BREAST AND NIPPLE: ICD-10-CM

## 2021-06-02 DIAGNOSIS — Z90.49 ACQUIRED ABSENCE OF OTHER SPECIFIED PARTS OF DIGESTIVE TRACT: Chronic | ICD-10-CM

## 2021-06-02 DIAGNOSIS — Z42.1 ENCOUNTER FOR BREAST RECONSTRUCTION FOLLOWING MASTECTOMY: ICD-10-CM

## 2021-06-02 DIAGNOSIS — Z98.890 OTHER SPECIFIED POSTPROCEDURAL STATES: Chronic | ICD-10-CM

## 2021-06-02 LAB
ANION GAP SERPL CALC-SCNC: 9 MMOL/L — SIGNIFICANT CHANGE UP (ref 5–17)
BUN SERPL-MCNC: 17 MG/DL — SIGNIFICANT CHANGE UP (ref 7–23)
CALCIUM SERPL-MCNC: 9.1 MG/DL — SIGNIFICANT CHANGE UP (ref 8.4–10.5)
CHLORIDE SERPL-SCNC: 107 MMOL/L — SIGNIFICANT CHANGE UP (ref 96–108)
CO2 SERPL-SCNC: 28 MMOL/L — SIGNIFICANT CHANGE UP (ref 22–31)
CREAT SERPL-MCNC: 0.58 MG/DL — SIGNIFICANT CHANGE UP (ref 0.5–1.3)
GLUCOSE SERPL-MCNC: 124 MG/DL — HIGH (ref 70–99)
HCT VFR BLD CALC: 42.3 % — SIGNIFICANT CHANGE UP (ref 34.5–45)
HGB BLD-MCNC: 13.7 G/DL — SIGNIFICANT CHANGE UP (ref 11.5–15.5)
MCHC RBC-ENTMCNC: 28.7 PG — SIGNIFICANT CHANGE UP (ref 27–34)
MCHC RBC-ENTMCNC: 32.4 GM/DL — SIGNIFICANT CHANGE UP (ref 32–36)
MCV RBC AUTO: 88.5 FL — SIGNIFICANT CHANGE UP (ref 80–100)
NRBC # BLD: 0 /100 WBCS — SIGNIFICANT CHANGE UP (ref 0–0)
PLATELET # BLD AUTO: 310 K/UL — SIGNIFICANT CHANGE UP (ref 150–400)
POTASSIUM SERPL-MCNC: 3.5 MMOL/L — SIGNIFICANT CHANGE UP (ref 3.5–5.3)
POTASSIUM SERPL-SCNC: 3.5 MMOL/L — SIGNIFICANT CHANGE UP (ref 3.5–5.3)
RBC # BLD: 4.78 M/UL — SIGNIFICANT CHANGE UP (ref 3.8–5.2)
RBC # FLD: 14.6 % — HIGH (ref 10.3–14.5)
SODIUM SERPL-SCNC: 144 MMOL/L — SIGNIFICANT CHANGE UP (ref 135–145)
WBC # BLD: 7.3 K/UL — SIGNIFICANT CHANGE UP (ref 3.8–10.5)
WBC # FLD AUTO: 7.3 K/UL — SIGNIFICANT CHANGE UP (ref 3.8–10.5)

## 2021-06-02 PROCEDURE — G0463: CPT

## 2021-06-02 PROCEDURE — 80048 BASIC METABOLIC PNL TOTAL CA: CPT

## 2021-06-02 PROCEDURE — 85027 COMPLETE CBC AUTOMATED: CPT

## 2021-06-02 PROCEDURE — 36415 COLL VENOUS BLD VENIPUNCTURE: CPT

## 2021-06-02 RX ORDER — MOXIFLOXACIN HYDROCHLORIDE TABLETS, 400 MG 400 MG/1
1 TABLET, FILM COATED ORAL
Qty: 0 | Refills: 0 | DISCHARGE

## 2021-06-02 RX ORDER — CETIRIZINE HYDROCHLORIDE 10 MG/1
1 TABLET ORAL
Qty: 0 | Refills: 0 | DISCHARGE

## 2021-06-02 RX ORDER — DIPHENHYDRAMINE HCL 50 MG
1 CAPSULE ORAL
Qty: 0 | Refills: 0 | DISCHARGE

## 2021-06-02 NOTE — H&P PST ADULT - NSICDXPASTSURGICALHX_GEN_ALL_CORE_FT
PAST SURGICAL HISTORY:  H/O abdominoplasty     H/O breast surgery left expander removed due to in 2021    H/O:  , , 2006    S/P bilateral mastectomy     S/P breast reconstruction, bilateral     S/P cholecystectomy 2016    S/P tonsillectomy

## 2021-06-02 NOTE — H&P PST ADULT - NSICDXPROBLEM_GEN_ALL_CORE_FT
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  Pre op instructions given and patient verbalized understanding.  CBC, BMP pending.  EKG, ECHO, stress test and cardio note on chart.  MERARY precautions - stop bang 2 COVID-19 testing not required- vaccine card on chart

## 2021-06-02 NOTE — H&P PST ADULT - HISTORY OF PRESENT ILLNESS
53 yo female with history of double mastectomy, and family history of breast cancer.  Pt had left breast implant 2/5/20 became infected planning removal of implant.  Pt states symptoms improved with antibiotics. 54 yo female with history of double mastectomy, GERD, RBBB and left tissue expander infection presents for PST.  Patient states that she had her left breast tissue expander removed 2/2020 due to infection.  Was treated with antibiotic after having expander removed and infection resolved.  Has not been able to come back to have it replaced due to pandemic.  Now scheduled for left breast reconstruction w/ tissue expander on 06/16/2021 with Dr Valentin.  COVID-19 testing not required- patient fully vaccinated  56 yo female with history of double mastectomy, GERD, RBBB and left tissue expander infection presents for PST.  Patient states that she had her left breast tissue expander removed 2/2020 due to infection.  Was treated with antibiotic after having expander removed and infection resolved.  Has not been able to come back to have it replaced due to pandemic.  Now scheduled for left breast reconstruction w/ tissue expander on 06/16/2021 with Dr Valentin.  COVID-19 testing not required- patient fully vaccinated   patient also reports recent cardiac work up due to back pain to r/o that it was cardiac in nature- cardio note and testing requested and on chart - testing WNL

## 2021-06-02 NOTE — H&P PST ADULT - NSANTHOSAYNRD_GEN_A_CORE
neck 15.25 inches/No. MERARY screening performed.  STOP BANG Legend: 0-2 = LOW Risk; 3-4 = INTERMEDIATE Risk; 5-8 = HIGH Risk

## 2021-06-02 NOTE — H&P PST ADULT - NSICDXPASTMEDICALHX_GEN_ALL_CORE_FT
PAST MEDICAL HISTORY:  Acid reflux     Breast lump in female     COVID-19 vaccine series completed Pfizer 04/23/2021    Malignant neoplasm of breast      PAST MEDICAL HISTORY:  Acid reflux     Breast lump in female     COVID-19 vaccine series completed Pfizer 04/23/2021    History of right bundle branch block (RBBB)     Malignant neoplasm of breast

## 2021-06-16 ENCOUNTER — APPOINTMENT (OUTPATIENT)
Dept: PLASTIC SURGERY | Facility: HOSPITAL | Age: 56
End: 2021-06-16

## 2021-09-18 NOTE — ASU PATIENT PROFILE, ADULT - TEACHING/LEARNING EDUCATIONAL LEVEL
[facility] Consult/H&P Note      Attending: Ryan Perez M.D.  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 She tells me that two days ago, she began to experience left flank pain, dysuria, and gross hematuria. She notes that this worsened in severity prompting her to present to the ED last night. RBUS at that time demonstrated non obstructing left renal stone and bladder wall thickening consistent with infection. UA at that time was nitrite positive. WBC was within normal limits and the patient is afebrile,        Past Medical History:   Past Medical History:   Diagnosis Date   • Allergy     latex   • Anxiety    • Cystinuria (HCC)    • Cystinuria (HCC)    • Nondisplaced bimalleolar fracture of left lower leg, initial encounter for closed fracture 2007    left lower leg fracture, splinted, no surgery   • Renal disorder     Kidney stone    • Urinary tract infection     pt states she frequent UTIs due to the kidney stones        Past Surgical History:   Past Surgical History:   Procedure Laterality Date   • PB CYSTOSCOPY,INSERT URETERAL STENT Left 7/1/2020    Procedure: CYSTOSCOPY, WITH URETERAL STENT INSERTION;  Surgeon: Rafiq Gómez M.D.;  Location: [facility];  Service: Urology   • PB CYSTO/URETERO/PYELOSCOPY, DX Left 7/1/2020    Procedure: URETEROSCOPY;  Surgeon: Rafiq Gómez M.D.;  Location: [facility];  Service: Urology   • LASERTRIPSY  7/1/2020    Procedure: LITHOTRIPSY, USING LASER;  Surgeon: Rafiq Gómez M.D.;  Location: [facility];  Service: Urology   • PB CYSTOSCOPY,INSERT  URETERAL STENT Left 8/19/2019    Procedure: CYSTOSCOPY, WITH URETERAL STENT INSERTION;  Surgeon: Rafiq Gómez M.D.;  Location: [facility];  Service: Urology   • LASERTRIPSY Left 8/19/2019    Procedure: LITHOTRIPSY, USING LASER;  Surgeon: Rafiq Gómez M.D.;  Location: SURGERY [facility];  Service: Urology   • URETEROSCOPY Left 11/13/2018    Procedure: URETEROSCOPY;  Surgeon: Francisco Sherman M.D.;  Location: SURGERY [facility];  Service: Urology   • STENT PLACEMENT Left 11/13/2018    Procedure: STENT PLACEMENT;  Surgeon: Francisco Sherman M.D.;  Location: SURGERY [facility];  Service: Urology   • CYSTOSCOPY  11/13/2018    Procedure: CYSTOSCOPY;  Surgeon: Francisco Sherman M.D.;  Location: [facility];  Service: Urology   • LITHOTRIPSY Left 11/13/2018    Procedure: LITHOTRIPSY;  Surgeon: Francisco Sherman M.D.;  Location: [facility];  Service: Urology   • PERCUTANEOUS NEPHROSTOLITHOTOMY Left 11/5/2018    Procedure: PERCUTANEOUS NEPHROSTOLITHOTOMY (PCNL);  Surgeon: Danny Harrison M.D.;  Location: [facility];  Service: Urology   • STENT REPLACEMENT Left 11/5/2018    Procedure: STENT REPLACEMENT;  Surgeon: Danny Harrison M.D.;  Location: [facility];  Service: Urology   • OTHER  2006    cyst excision to buttock        Family History:   Family History   Problem Relation Age of Onset   • No Known Problems Mother    • No Known Problems Father    • No Known Problems Sister    • No Known Problems Brother    • No Known Problems Maternal Grandmother    • No Known Problems Maternal Grandfather    • No Known Problems Paternal Grandmother    • No Known Problems Paternal Grandfather    • No Known Problems Sister          Social History:   Social History     Tobacco Use   • Smoking status: Never Smoker   • Smokeless tobacco: Never Used   Vaping Use   • Vaping Use: Never used   Substance Use Topics   • Alcohol use: No   • Drug use: No      Social  "History     Social History Narrative   • Not on file        Allergies: she Latex    Medications:   Medications Prior to Admission   Medication Sig Dispense Refill Last Dose   • tiopronin (THIOLA) 100 MG tablet Take 300 mg by mouth 3 times a day. 3 tablets = 300 mg.   9/17/2021 at 1615   • Ascorbic Acid (VITAMIN C) 1000 MG Tab Take 1,000 mg by mouth every morning.   9/18/2021 at 0900   • D-Mannose 500 MG Cap Take 500 mg by mouth 3 times a day.   9/18/2021 at 1615   • sertraline (ZOLOFT) 100 MG Tab Take 250 mg by mouth every morning. 2 & 1/2 tablets = 250 mg.   9/18/2021 at 0900   • ARIPiprazole (ABILIFY) 5 MG tablet Take 2.5 mg by mouth every morning. 1/2 tablet = 2.5 mg.   9/17/2021 at 0900   • QUEtiapine (SEROQUEL) 25 MG Tab Take 25 mg by mouth at bedtime.   9/9/2021 at HS   • medroxyPROGESTERone (DEPO-PROVERA) 150 MG/ML Suspension Inject 150 mg into the shoulder, thigh, or buttocks every 90 days.   MAY 2021 at OVERDUE   • potassium citrate (UROCIT-K) 5 MEQ (540 MG) Tab CR Take 15 mEq by mouth 2 times a day. 3 tablets = 15 mEq.   9/18/2021 at 0900         Review of Systems  Review of Systems   Constitutional: Negative for chills and fever.   Gastrointestinal: Negative for abdominal pain, nausea and vomiting.   Genitourinary: Positive for dysuria and flank pain. Negative for frequency, hematuria and urgency.   All other systems reviewed and are negative.       Physical Exam  VITAL SIGNS: BP (!) 92/55   Pulse (!) 50 Comment: sleeping, rn aware  Temp 36.4 °C (97.6 °F) (Temporal)   Resp 16   Ht 1.638 m (5' 4.5\")   Wt 47.9 kg (105 lb 9.6 oz)   LMP 09/13/2021   SpO2 96%   BMI 17.85 kg/m²   Physical Exam  Vitals and nursing note reviewed.   Constitutional:       Appearance: Normal appearance.   HENT:      Head: Normocephalic and atraumatic.   Eyes:      Pupils: Pupils are equal, round, and reactive to light.   Pulmonary:      Effort: Pulmonary effort is normal.   Neurological:      General: No focal deficit " present.      Mental Status: She is alert and oriented to person, place, and time.   Psychiatric:         Mood and Affect: Mood normal.         Behavior: Behavior normal.           Labs:  Recent Labs     09/17/21  2241   WBC 9.0   RBC 4.30   HEMOGLOBIN 13.8   HEMATOCRIT 39.0   MCV 90.7   MCH 32.1   MCHC 35.4*   RDW 40.8   PLATELETCT 305   MPV 8.6*     Recent Labs     09/17/21  2241   SODIUM 139   POTASSIUM 3.5*   CHLORIDE 103   CO2 24   GLUCOSE 83   BUN 12   CREATININE 0.95   CALCIUM 9.8         Glucose:  Recent Labs     09/17/21  2241   GLUCOSE 83     Coags:  No results for input(s): INR in the last 72 hours.      Urinalysis:   Recent Labs     09/17/21 2113   COLORURINE Yellow   CLARITY Turbid*   SPECGRAVITY 1.018   PHURINE 7.0   GLUCOSEUR Negative   KETONES Negative   NITRITE Positive*   OCCULTBLOOD Moderate*   RBCURINE 5-10*   BACTERIA Many*   EPITHELCELL Few       Imaging:  US-RENAL   Final Result         1.  Left nephrolithiasis without visualized obstructive changes.   2.  Debris within the bladder.   3.  Mildly thickened bladder wall, consider cystitis or other bladder wall pathology. Correlate with urinalysis. Could be further evaluated with cystoscopy as clinically appropriate.   4.  Left ovarian cyst without visualized complex features.               Assessment/Recommendation   18 y.o. F with cystinuria, UTI, left nonobstructive renal stone.    · We would ask that pediatrician continue antibiotics at this time for presumed pyelonephritis. We will continue to monitor stone burden and do not recommend acute surgical intervention. We would encourage continued compliance with potassium citrate. We will arrange outpatient follow up and are OK with discharge when deemed clinically stable.     Plan directed by Dr. Harrison. Case discussed with patient, peds, and urology. Thank you for this consult. Please contact us with questions.        Dayanna Watts P.A.-C.   [id number] [id number] [phone]  [facility]

## 2021-12-08 NOTE — DISCHARGE NOTE PROVIDER - NSDCCAREPROVSEEN_GEN_ALL_CORE_FT
This is a 67-year-old female with a history of hypertension, hyperlipidemia, asthma, obesity, sleep apnea, migraines, upper GI bleed secondary to peptic ulcer disease (2010 in 2014), GERD, diarrhea predominant irritable bowel syndrome, and a family history of colon cancer and 2 second-degree relatives due screening in 2025 presenting for follow-up. She was last seen 1/5/2021.  GERD symptoms were well controlled on pantoprazole 40 mg twice a day.  She reported occasional difficulty swallowing pills.  It was suspected this was associated with lack of esophageal lubrication.  She denied difficulty swallowing food.  She reported a prior attempted EGD during hospitalization for significant upper GI bleed.  Records were requested for review.  She was instructed to swallow liquid prior to taking medication.  Barium swallow and EGD for future considerations.  IBS was controlled with fiber and a probiotic. EGD 2/15/2014:A large clot was identified over a gastric ulcer.  The clot was not disturbed.  Duodenum was unremarkable.  Fidel Adames

## 2022-02-09 NOTE — BRIEF OPERATIVE NOTE - NSICDXBRIEFPROCEDURE_GEN_ALL_CORE_FT
PROCEDURES:  Nipple sparing mastectomy of both breasts 11-Sep-2019 09:52:04  Mk Padilla
PE:   GEN: Awake, alert, interactive, NAD, non-toxic appearing.   HEAD AND NECK: NC/AT. Airway patent. Neck supple.   EYES: Clear b/l. PERRL  CARDIAC: RRR. S1, S2. No evident pedal edema.    RESP: Normal respiratory effort with no use of accessory muscles or retractions. Clear throughout on auscultation.  ABD: soft, non-distended, + RUQ tenderness with + murphys sign. No rebound, no guarding.   NEURO: AOx3, CN II-XII grossly intact, no focal deficits.   MSK: Moving all extremities with no apparent deformities.   SKIN: Warm, dry, intact normal color
PROCEDURES:  Breast reconstruction with tissue expander 11-Sep-2019 12:22:28  Jg Nelson

## 2022-03-23 NOTE — H&P PST ADULT - GENERAL
Please see mychart and advise appropriate course of action.    Korina Casey RN  
Please see mychart from patient and advise appropriate course of action.    Leatha Kim RN  [facility] Triage Nurse  
Unsure what patient is asking?  
details…
Yes

## 2022-05-04 NOTE — CONSULT NOTE ADULT - SUBJECTIVE AND OBJECTIVE BOX
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  aluminum hydroxide/magnesium hydroxide/simethicone Suspension 30 milliLiter(s) Oral every 4 hours PRN Dyspepsia  calcium carbonate    500 mG (Tums) Chewable 3 Tablet(s) Chew every 6 hours PRN Dyspepsia  ondansetron Injectable 4 milliGRAM(s) IV Push every 6 hours PRN Nausea  oxyCODONE    5 mG/acetaminophen 325 mG 1 Tablet(s) Oral every 4 hours PRN Moderate Pain (4 - 6)      Vital Signs Last 24 Hrs  T(F): 99.1 (13 May 2019 11:00), Max: 99.1 (13 May 2019 11:00)  HR: 108 (13 May 2019 11:00) (108 - 108)  BP: 109/65 (13 May 2019 11:00) (109/65 - 109/65)  RR: 21 (13 May 2019 11:00) (21 - 21)  SpO2: 96% (13 May 2019 11:00) (96% - 96%)      PHYSICAL EXAM:  GENERAL: NAD, well-developed  HEAD: Atraumatic, Normocephalic  EYES: EOMI, PERRLA, conjunctiva and sclera clear  NECK: Supple  CHEST/LUNG: Clear to auscultation bilaterally; No wheezes/rales/rhonchi  HEART: Regular rate and rhythm; No murmurs, rubs, or gallops  ABDOMEN: Soft, Nontender, Nondistended; Bowel sounds present  EXTREMITIES: 2+ dP pulses b/l, No clubbing, cyanosis, or edema  PSYCH: reactive affect  NEUROLOGY: AAOx3, non-focal  SKIN: diffuse maculopapular rash over, no vesicles or central clearing, +blanching; surgical incision sites appear dry, intact without purulence or drainage      LABS:                                   12.2   15.35 )-----------( 352      ( 13 May 2019 12:01 )             38.8   Bax     N82.4  L14.5  M1.3   E0.7            150<H>  |  112<H>  |  7   ----------------------------<  99  4.3   |  20<L>  |  0.60    Ca    9.0      13 May 2019 12:01  Phos  3.0       Mg     1.8         TPro  6.3  /  Alb  3.1<L>  /  TBili  0.6  /  DBili  < 0.2  /  AST  17  /  ALT  17  /  AlkPhos  51  -      I&O's Summary      RADIOLOGY & ADDITIONAL TESTS:    Imaging Personally Reviewed:  [ ] YES  [ ] NO    Consultant(s) Notes Reviewed:  [ ] YES  [ ] NO    Care Discussed with Consultants/Other Providers [ ] YES  [ ] NO Patient is a 53y old  Female who presents with a chief complaint of     INTERVAL HPI/OVERNIGHT EVENTS:  Patient is a 52 yo F w/PMH of w/PMH of TIA (), recently underwent right-sided lumpectomy and left-sided breast reduction surgery for lobular carcinoma in situ (5/3) who presents as a transfer from [facility] in [place] for fevers and diffuse rash. She reports that starting Friday 5/10, she developed fever to 101 associated with a rash which started on her legs and progressed to involve her arms, abdomen, back and face. She reports some pruritis on her hands and feet. For this, she took Benadryl with minimal relief. At [facility], she was found to have a leukocytosis to 17. She underwent a CT chest at the OSH which showed postsurgical changes in both breasts with infiltrative changes, minimal jean and in the inferolateral aspect of the right breast, there is a partially loculated 6.0x1.7cm fluid collection. Per the CT report, these findings are probably related to postoperative change however superimposed cellulitis or early abscess formation would be difficult to totally exclude. At the OSH she was started on Vancomycin and Clindamycin. UA on  at OSH was negative. At present, she reports feeling chills but denies sore throat, nausea, vomiting, SOB, chest pain, abdominal pain or diarrhea. Denies drainage from surgical incision sites. Reports her children may have recently had a mild viral illness. Notes she received all childhood vaccines.    No known drug allergies. The only new medication has been oxycodone which she started using 7 days prior to the onset of her symptoms.      PAST MEDICAL & SURGICAL HISTORY:  Breast lump in female  Acid reflux  S/P cholecystectomy:   H/O abdominoplasty:   H/O: : , , 2006      Allergies  No Known Allergies    FAMILY HISTORY:  FH: heart disease: father  FH: diabetes mellitus: father  Family history of rectal cancer: mother  FH: lymphoma: sister  Family history of breast cancer: sister, mother  FH: HTN (hypertension): mother, father      Social History:  Smoking: denies  Alcohol: denies  Illicit Substance: denies    REVIEW OF SYSTEMS:  Constitutional: denies fevers, chills, night sweats, weight loss  HEENT: denies visual changes, hearing changes, rhinitis, odynophagia, or dysphagia  Cardiovascular: denies palpitations, chest pain, edema  Respiratory: denies SOB, wheezing  Gastrointestinal: denies N/V/D, abdominal pain, hematochezia, melena  : denies dysuria, hematuria  MSK: denies weakness, joint pain  Neuro: no numbness or tingling  Psych: no depression or anxiety  Skin: denies new masses or drainage for surgical site; +RASH  Allergy: Denies anaphylaxis      MEDICATIONS  (STANDING):  docusate sodium 100 milliGRAM(s) Oral three times a day  sodium chloride 0.9%. 1000 milliLiter(s) (125 mL/Hr) IV Continuous <Continuous>      MEDICATIONS  (PRN):  acetaminophen   Tablet .. 650 milliGRAM(s) Oral every 6 hours PRN Temp greater or equal to 38C (100.4F), Mild Pain (1 - 3)  aluminum hydroxide/magnesium hydroxide/simethicone Suspension 30 milliLiter(s) Oral every 4 hours PRN Dyspepsia  calcium carbonate    500 mG (Tums) Chewable 3 Tablet(s) Chew every 6 hours PRN Dyspepsia  ondansetron Injectable 4 milliGRAM(s) IV Push every 6 hours PRN Nausea  oxyCODONE    5 mG/acetaminophen 325 mG 1 Tablet(s) Oral every 4 hours PRN Moderate Pain (4 - 6)      Vital Signs Last 24 Hrs  T(F): 99.1 (13 May 2019 11:00), Max: 99.1 (13 May 2019 11:00)  HR: 108 (13 May 2019 11:00) (108 - 108)  BP: 109/65 (13 May 2019 11:00) (109/65 - 109/65)  RR: 21 (13 May 2019 11:00) (21 - 21)  SpO2: 96% (13 May 2019 11:00) (96% - 96%)      PHYSICAL EXAM:  GENERAL: NAD, well-developed  HEAD: Atraumatic, Normocephalic  EYES: EOMI, PERRLA, conjunctiva and sclera clear  NECK: Supple  CHEST/LUNG: Clear to auscultation bilaterally; No wheezes/rales/rhonchi  HEART: Regular rate and rhythm; No murmurs, rubs, or gallops  ABDOMEN: Soft, Nontender, Nondistended; Bowel sounds present  EXTREMITIES: 2+ dP pulses b/l, No clubbing, cyanosis, or edema  PSYCH: reactive affect  NEUROLOGY: AAOx3, non-focal  SKIN: diffuse maculopapular rash over, no vesicles or central clearing, +blanching; surgical incision sites appear dry, intact without purulence or drainage      LABS:                                   12.2   15.35 )-----------( 352      ( 13 May 2019 12:01 )             38.8   Bax     N82.4  L14.5  M1.3   E0.7            150<H>  |  112<H>  |  7   ----------------------------<  99  4.3   |  20<L>  |  0.60    Ca    9.0      13 May 2019 12:01  Phos  3.0       Mg     1.8         TPro  6.3  /  Alb  3.1<L>  /  TBili  0.6  /  DBili  < 0.2  /  AST  17  /  ALT  17  /  AlkPhos  51        I&O's Summary      RADIOLOGY & ADDITIONAL TESTS:    Imaging Personally Reviewed:  [ ] YES  [ ] NO    Consultant(s) Notes Reviewed:  [ ] YES  [ ] NO    Care Discussed with Consultants/Other Providers [ ] YES  [ ] NO

## 2022-06-18 NOTE — H&P PST ADULT - TERM DELIVERIES, OB PROFILE
Discharge instructions were given to the patient by Sue Stewart RN. The patient left the Emergency Department ambulatory, alert and oriented and in no acute distress with 3 prescriptions. The patient was encouraged to call or return to the ED for worsening issues or problems and was encouraged to schedule a follow up appointment for continuing care. The patient verbalized understanding of discharge instructions and prescriptions, all questions were answered. The patient has no further concerns at this time. 3

## 2022-12-02 PROBLEM — Z92.29 PERSONAL HISTORY OF OTHER DRUG THERAPY: Chronic | Status: ACTIVE | Noted: 2021-06-02

## 2022-12-02 PROBLEM — Z86.79 PERSONAL HISTORY OF OTHER DISEASES OF THE CIRCULATORY SYSTEM: Chronic | Status: ACTIVE | Noted: 2021-06-02

## 2022-12-29 ENCOUNTER — APPOINTMENT (OUTPATIENT)
Dept: SURGICAL ONCOLOGY | Facility: CLINIC | Age: 57
End: 2022-12-29
Payer: COMMERCIAL

## 2022-12-29 VITALS
WEIGHT: 200 LBS | DIASTOLIC BLOOD PRESSURE: 87 MMHG | HEART RATE: 74 BPM | HEIGHT: 63 IN | BODY MASS INDEX: 35.44 KG/M2 | OXYGEN SATURATION: 95 % | SYSTOLIC BLOOD PRESSURE: 125 MMHG

## 2022-12-29 DIAGNOSIS — D05.01 LOBULAR CARCINOMA IN SITU OF RIGHT BREAST: ICD-10-CM

## 2022-12-29 DIAGNOSIS — D05.02 LOBULAR CARCINOMA IN SITU OF RIGHT BREAST: ICD-10-CM

## 2022-12-29 PROCEDURE — 99213 OFFICE O/P EST LOW 20 MIN: CPT

## 2023-02-13 ENCOUNTER — APPOINTMENT (OUTPATIENT)
Dept: PLASTIC SURGERY | Facility: CLINIC | Age: 58
End: 2023-02-13

## 2023-03-08 NOTE — H&P PST ADULT - SKIN/BREAST COMMENTS
hx of breast lump, see HPI Stage 2: Additional Anesthesia Type: 1% lidocaine with epinephrine and a 1:10 solution of 8.4% sodium bicarbonate

## 2023-04-04 NOTE — PROGRESS NOTE ADULT - PROVIDER SPECIALTY LIST ADULT
April 4, 2023     Patient: Sekou Hermosillo   YOB: 1979   Date of Visit: 4/4/2023       To Whom it May Concern:    Sekou Hermosillo was seen in my clinic on 4/4/2023 at 10:00 am.    Please excuse Sekou for her absence from work on the date listed above to be able to make her appointment. Patient may return to work on 05/01/2023.    Sincerely,         Gabriel Kang MD    Medical information is confidential and cannot be disclosed without the written consent of the patient or her representative.       Plastic Surgery

## 2023-04-20 NOTE — PACU DISCHARGE NOTE - AIRWAY PATENCY:
[facility] Care Transitions Initial Follow Up Call    Call within 2 business days of discharge: Yes    Patient: Balaji Steen Patient : 1948   MRN: [id number]  [facility] [address] . .. ED to Hosp-Admission (Discharged) (ADMITTED) Jignesh Day MD; Keisha Almanza. .. Was this an external facility discharge?  No Discharge Facility: [facility]  Follow Up  Future Appointments   Date Time Provider Александр Haddad   2023  3:30 PM Sudha Wade MD [facility]KO[facility] Oquendo, RN
Satisfactory

## 2023-05-17 NOTE — REASON FOR VISIT
[Follow-Up: _____] : a [unfilled] follow-up visit [Family Member] : family member [FreeTextEntry1] : s/p bilateral breast reconstruction with tissue expanders 9/11/19 Never

## 2023-07-24 ENCOUNTER — APPOINTMENT (OUTPATIENT)
Dept: PLASTIC SURGERY | Facility: CLINIC | Age: 58
End: 2023-07-24
Payer: COMMERCIAL

## 2023-07-24 DIAGNOSIS — Z90.13 ACQUIRED ABSENCE OF BILATERAL BREASTS AND NIPPLES: ICD-10-CM

## 2023-07-24 PROCEDURE — 99215 OFFICE O/P EST HI 40 MIN: CPT

## 2023-07-26 ENCOUNTER — APPOINTMENT (OUTPATIENT)
Dept: BARIATRICS | Facility: CLINIC | Age: 58
End: 2023-07-26
Payer: COMMERCIAL

## 2023-07-26 ENCOUNTER — NON-APPOINTMENT (OUTPATIENT)
Age: 58
End: 2023-07-26

## 2023-07-26 VITALS
HEART RATE: 82 BPM | BODY MASS INDEX: 37.21 KG/M2 | HEIGHT: 63 IN | WEIGHT: 210 LBS | OXYGEN SATURATION: 99 % | SYSTOLIC BLOOD PRESSURE: 124 MMHG | DIASTOLIC BLOOD PRESSURE: 78 MMHG | TEMPERATURE: 97 F

## 2023-07-26 DIAGNOSIS — E46 UNSPECIFIED PROTEIN-CALORIE MALNUTRITION: ICD-10-CM

## 2023-07-26 DIAGNOSIS — R63.5 ABNORMAL WEIGHT GAIN: ICD-10-CM

## 2023-07-26 DIAGNOSIS — M19.90 UNSPECIFIED OSTEOARTHRITIS, UNSPECIFIED SITE: ICD-10-CM

## 2023-07-26 DIAGNOSIS — Z71.82 EXERCISE COUNSELING: ICD-10-CM

## 2023-07-26 DIAGNOSIS — K21.9 GASTRO-ESOPHAGEAL REFLUX DISEASE W/OUT ESOPHAGITIS: ICD-10-CM

## 2023-07-26 PROCEDURE — 99205 OFFICE O/P NEW HI 60 MIN: CPT

## 2023-07-26 NOTE — ASSESSMENT
[FreeTextEntry1] : BENITO GRUBBS is a 57 year old F with a long-standing h/o obesity, who presents today for initial evaluation for weight management options. \par I had a lengthy discussion with the patient regarding nutrition, exercise, weight loss medications, and bariatric surgery. The patient qualifies for and is most interested in weight loss medications.\par -------\par Health maintenance and behavioral/nutrition counseling for obesity: An additional 30 minutes of the visit was spent counseling the patient regarding need for aggressive weight loss and behavior modification including nutrition and proper eating habits, including which foods to eat and avoid. \par I had a lengthy discussion regarding the patient's current nutrition and eating habits as detailed above in the HPI. I emphasized the importance of making healthier food choices including fresh fruits and vegetables, lean meats and protein sources. I recommended front loading calories, incorporating whole grains, and eliminating fast foods. I also discussed the importance of avoiding fried/fatty foods and foods containing high sugar content including juices/shakes/sodas/desserts. \par I also encouraged beginning an exercise program and recommended cardiovascular exercises along with strength training to build lean muscle. I made suggestions on different types of exercises to try.\par Patient will work on the following:\par -Meet with nutritionist \par -Eliminate snacks \par -Focus on eating 3 well-balanced meals during the daytime with appropriate portion size\par -Cooking fresh meals rather than take out/processed/ready-made foods\par -Incorporating exercise\par ------------\par \par Return to office in 1 month\par Will begin [employer] once authorization obtained\par Patient educated to call with questions/concerns\par All questions answered\par \par Additional time spent before and after visit reviewing chart.\par \par \par

## 2023-07-26 NOTE — HISTORY OF PRESENT ILLNESS
[de-identified] : BENITO GRUBBS is a 57 year old F with a long-standing h/o obesity, who presents today for initial evaluation for weight management options.\par Patient with history of LCIS s/p bilateral mastectomy with Alfredo, pending further reconstructive surgery. Patient referred by Dr. Valentin for weight management.  \par Heaviest/current wt 210lbs, lowest wt 135  lbs. Goal weight: 135\par Diets/exercise programs tried in the past: yes, regained weight\par Weight loss medications tried in the past: no\par Reason For stopping weight loss medication if applicable: n/a\par Obesity comorbidities: acid reflux, arthritis \par \par Personal or family history of:\par Pancreatitis: no\par Gallstones: s/p cholecystectomy\par Kidney stones: once about 20 years ago passed by itself \par MEN syndrome: no\par Medullary thyroid cancer: no\par \par Health Screenings:\par Last colonoscopy: has had 2 in the past, scheduled again in Nov, mother with h/o rectal ca\par Last mammogram: s/p b/l mastectomy for LCIS\par Last PAP smear: yearly reportedly normal \par Menopause at age 44\par \par Current dietary lifestyle:\par Breakfast: emili with 2 eggs\par Lunch: chicken rice veggies\par 4PM hungry: chicken, rice, veggies\par Dinner: chicken/meat rice with veggies\par Snacks: chocolate \par Drinks: water, unsweetened iced tea\par Does not tolerate vegetables as well after lap michael\par \par Activity Lifestyle: \par Sleep: 5-6hrs \par Physical activity/exercise: walking during the day\par 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

## 2023-07-26 NOTE — PHYSICAL EXAM
[Obese] : obese [Normal] : affect appropriate [de-identified] : Anicteric, no conjunctival injection [de-identified] : Supple [de-identified] : Equal chest rise, nonlabored respirations. No audible wheezing. [de-identified] : Regular rate and rhythm. [de-identified] : Soft, Obese, nontender [de-identified] : No obvious deformity

## 2023-07-27 ENCOUNTER — NON-APPOINTMENT (OUTPATIENT)
Age: 58
End: 2023-07-27

## 2023-07-27 RX ORDER — SEMAGLUTIDE 0.25 MG/.5ML
0.25 INJECTION, SOLUTION SUBCUTANEOUS
Qty: 1 | Refills: 0 | Status: ACTIVE | COMMUNITY
Start: 2023-07-27 | End: 1900-01-01

## 2023-07-27 RX ORDER — MULTIVITAMIN
TABLET ORAL DAILY
Refills: 0 | Status: ACTIVE | COMMUNITY

## 2023-07-27 RX ORDER — MULTIVIT-MIN/IRON/FOLIC ACID/K 18-600-40
400 CAPSULE ORAL DAILY
Refills: 0 | Status: ACTIVE | COMMUNITY

## 2023-08-02 RX ORDER — FAMOTIDINE 10 MG/ML
1 INJECTION INTRAVENOUS
Qty: 0 | Refills: 0 | DISCHARGE

## 2023-08-02 RX ORDER — SULFAMETHOXAZOLE AND TRIMETHOPRIM 800; 160 MG/1; MG/1
800-160 TABLET ORAL
Qty: 14 | Refills: 0 | Status: ACTIVE | COMMUNITY
Start: 2023-08-02 | End: 1900-01-01

## 2023-08-02 RX ORDER — IBUPROFEN 800 MG/1
800 TABLET, FILM COATED ORAL EVERY 6 HOURS
Qty: 15 | Refills: 0 | Status: ACTIVE | COMMUNITY
Start: 2023-08-02 | End: 1900-01-01

## 2023-08-02 RX ORDER — TRAMADOL HYDROCHLORIDE 50 MG/1
50 TABLET, COATED ORAL
Qty: 10 | Refills: 0 | Status: ACTIVE | COMMUNITY
Start: 2023-08-02 | End: 1900-01-01

## 2023-08-04 ENCOUNTER — APPOINTMENT (OUTPATIENT)
Dept: PLASTIC SURGERY | Facility: HOSPITAL | Age: 58
End: 2023-08-04

## 2023-08-14 ENCOUNTER — APPOINTMENT (OUTPATIENT)
Dept: BARIATRICS/WEIGHT MGMT | Facility: CLINIC | Age: 58
End: 2023-08-14
Payer: COMMERCIAL

## 2023-08-14 VITALS — BODY MASS INDEX: 37.21 KG/M2 | WEIGHT: 210 LBS | HEIGHT: 63 IN

## 2023-08-14 DIAGNOSIS — E66.9 OBESITY, UNSPECIFIED: ICD-10-CM

## 2023-08-14 PROCEDURE — ZZZZZ: CPT

## 2023-08-30 ENCOUNTER — APPOINTMENT (OUTPATIENT)
Dept: BARIATRICS/WEIGHT MGMT | Facility: CLINIC | Age: 58
End: 2023-08-30

## 2023-09-12 ENCOUNTER — APPOINTMENT (OUTPATIENT)
Dept: BARIATRICS/WEIGHT MGMT | Facility: CLINIC | Age: 58
End: 2023-09-12

## 2024-01-12 ENCOUNTER — APPOINTMENT (OUTPATIENT)
Dept: SURGICAL ONCOLOGY | Facility: CLINIC | Age: 59
End: 2024-01-12
Payer: COMMERCIAL

## 2024-01-12 VITALS
OXYGEN SATURATION: 97 % | HEIGHT: 63 IN | HEART RATE: 84 BPM | WEIGHT: 210 LBS | RESPIRATION RATE: 17 BRPM | BODY MASS INDEX: 37.21 KG/M2 | SYSTOLIC BLOOD PRESSURE: 121 MMHG | DIASTOLIC BLOOD PRESSURE: 83 MMHG

## 2024-01-12 PROCEDURE — 99203 OFFICE O/P NEW LOW 30 MIN: CPT

## 2024-01-18 NOTE — PHYSICAL EXAM
[Normal] : normal breast inspection and palpation of axillas [Normal Neck Lymph Nodes] : normal neck lymph nodes  [Normal Supraclavicular Lymph Nodes] : normal supraclavicular lymph nodes [Normal Groin Lymph Nodes] : normal groin lymph nodes [Normal Axillary Lymph Nodes] : normal axillary lymph nodes [Normal] : oriented to person, place and time, with appropriate affect [de-identified] : right chest wall skin and implant within normal exam. No axillary adenopathy. Left chest wall skin is tethered to chest wall in different places without a concerning nodule or mass. There is no appreciable adenopathy on exam

## 2024-01-18 NOTE — HISTORY OF PRESENT ILLNESS
[de-identified] : Ms. Campos is a 55 year old woman who presents for 1-year follow up following bilateral mastectomies for LCIS and a strong family history of breast cancer. She reports feeling well but has had a difficult course following reconstruction as she develed a left breast infection requiring implant removal. She has been following with Dr. Valentin to discuss the timing of reoperative reconstruction. She denies feeling any new masses or skin changes in either breast.   INTERIM 4/8/21: Ms. Campos presents today with a new symptom of "pinching" or tugging in the left axilla. She has noticed it for the last several weeks and possible months. She cannot feel any new masses in the chest or axilla. She saw Dr. Valentin earlier today to talk about scheduling delayed reconstruction of the left breast after she had to have her prior implant removed for infection.   INTERIM 12/29/22: Ms. Campos presents to the office with no new symptoms. She was last seen in April 2021, complained of having pinching/tugging sensation. She had left axilla US done in May 2021 that showed no lymphadenopathy. Currently denies having any of hose symptoms. She presents to the office for follow up visit.   INTERIM 1/12/24: Ms. Campos comes for yearly visit. She has still not decided on when/whether or not to perform her delayed left breast reconstruction. She denies any new soft tissue or chest wall masses or nipple discharge. She denies axillary masses.

## 2024-01-18 NOTE — REVIEW OF SYSTEMS
suicidal behavior suicidal behavior suicidal behavior suicidal behavior [Negative] : Heme/Lymph suicidal behavior suicidal behavior suicidal behavior suicidal behavior suicidal behavior suicidal behavior suicidal behavior suicidal behavior

## 2024-01-18 NOTE — ASSESSMENT
[FreeTextEntry1] : 4.5 year s/p bilateral mastectomies and reconstruction for LCIS. No evidence of local recurrence. Awaiting left breast delayed reconstruction once she is emotionally prepared to do so. I asked her to return in 1 year for repeat physical exam.

## 2024-01-18 NOTE — REASON FOR VISIT
[Follow-Up Visit] : a follow-up visit for [Breast Cancer] : breast cancer [FreeTextEntry2] : breast LCIS

## 2024-03-10 PROBLEM — Z71.82 EXERCISE COUNSELING: Status: ACTIVE | Noted: 2023-07-26

## 2024-04-06 ENCOUNTER — APPOINTMENT (OUTPATIENT)
Dept: ULTRASOUND IMAGING | Facility: IMAGING CENTER | Age: 59
End: 2024-04-06
Payer: COMMERCIAL

## 2024-04-06 ENCOUNTER — OUTPATIENT (OUTPATIENT)
Dept: OUTPATIENT SERVICES | Facility: HOSPITAL | Age: 59
LOS: 1 days | End: 2024-04-06
Payer: COMMERCIAL

## 2024-04-06 DIAGNOSIS — D05.02 LOBULAR CARCINOMA IN SITU OF LEFT BREAST: ICD-10-CM

## 2024-04-06 DIAGNOSIS — Z90.49 ACQUIRED ABSENCE OF OTHER SPECIFIED PARTS OF DIGESTIVE TRACT: Chronic | ICD-10-CM

## 2024-04-06 DIAGNOSIS — Z90.13 ACQUIRED ABSENCE OF BILATERAL BREASTS AND NIPPLES: Chronic | ICD-10-CM

## 2024-04-06 DIAGNOSIS — Z98.890 OTHER SPECIFIED POSTPROCEDURAL STATES: Chronic | ICD-10-CM

## 2024-04-06 DIAGNOSIS — Z90.89 ACQUIRED ABSENCE OF OTHER ORGANS: Chronic | ICD-10-CM

## 2024-04-06 DIAGNOSIS — D05.01 LOBULAR CARCINOMA IN SITU OF RIGHT BREAST: ICD-10-CM

## 2024-04-06 DIAGNOSIS — Z98.891 HISTORY OF UTERINE SCAR FROM PREVIOUS SURGERY: Chronic | ICD-10-CM

## 2024-04-06 PROCEDURE — 76882 US LMTD JT/FCL EVL NVASC XTR: CPT | Mod: 26,LT

## 2024-04-06 PROCEDURE — 76882 US LMTD JT/FCL EVL NVASC XTR: CPT

## 2024-04-06 PROCEDURE — 76882 US LMTD JT/FCL EVL NVASC XTR: CPT | Mod: 26,RT

## 2024-09-17 NOTE — ASU PATIENT PROFILE, ADULT - REASON FOR ADMISSION, PROFILE
[FreeTextEntry1] : #Sneddon Phan Disease chronic, flaring     s/p course of itraconazole 200mg daily x 10 days given initial c/f Majocchi     Bx c/w subcorneal neutrophilic pustule, acantholysis, and superficial perivascular     inflammation with differential initially including IgA pemphigus and Sneddon-Phan disease.     DIF was negative. Plan: - discussed tx options, pt not interested in dapsone (due to prev c/f developing symptomatic anemia while on dapsone) or colchicine         - continue clobetasol ointment BID x 3 weeks on x1 week off     - continue tacrolimus ointment BID during 1 week off  #Seborrheic Keratosis - pt counseled on benign nature of these lesions  RTC 3 mo, sooner PRN removal of left breast implant & capsulectomy